# Patient Record
Sex: MALE | Race: WHITE | URBAN - METROPOLITAN AREA
[De-identification: names, ages, dates, MRNs, and addresses within clinical notes are randomized per-mention and may not be internally consistent; named-entity substitution may affect disease eponyms.]

---

## 2017-02-20 RX ORDER — PAROXETINE HYDROCHLORIDE 40 MG/1
TABLET, FILM COATED ORAL
Qty: 30 TABLET | Refills: 2 | Status: SHIPPED | OUTPATIENT
Start: 2017-02-20 | End: 2017-04-03

## 2017-04-03 ENCOUNTER — OFFICE VISIT (OUTPATIENT)
Dept: FAMILY MEDICINE CLINIC | Age: 72
End: 2017-04-03

## 2017-04-03 VITALS
DIASTOLIC BLOOD PRESSURE: 68 MMHG | HEART RATE: 77 BPM | BODY MASS INDEX: 33.14 KG/M2 | TEMPERATURE: 98.9 F | RESPIRATION RATE: 16 BRPM | WEIGHT: 242 LBS | SYSTOLIC BLOOD PRESSURE: 154 MMHG | OXYGEN SATURATION: 97 %

## 2017-04-03 DIAGNOSIS — F41.8 SITUATIONAL ANXIETY: ICD-10-CM

## 2017-04-03 DIAGNOSIS — I10 ESSENTIAL HYPERTENSION: ICD-10-CM

## 2017-04-03 DIAGNOSIS — E78.5 HYPERLIPIDEMIA, UNSPECIFIED HYPERLIPIDEMIA TYPE: ICD-10-CM

## 2017-04-03 DIAGNOSIS — H69.83 ETD (EUSTACHIAN TUBE DYSFUNCTION), BILATERAL: Primary | ICD-10-CM

## 2017-04-03 PROCEDURE — 1036F TOBACCO NON-USER: CPT | Performed by: FAMILY MEDICINE

## 2017-04-03 PROCEDURE — 3017F COLORECTAL CA SCREEN DOC REV: CPT | Performed by: FAMILY MEDICINE

## 2017-04-03 PROCEDURE — 4040F PNEUMOC VAC/ADMIN/RCVD: CPT | Performed by: FAMILY MEDICINE

## 2017-04-03 PROCEDURE — 99214 OFFICE O/P EST MOD 30 MIN: CPT | Performed by: FAMILY MEDICINE

## 2017-04-03 PROCEDURE — G8427 DOCREV CUR MEDS BY ELIG CLIN: HCPCS | Performed by: FAMILY MEDICINE

## 2017-04-03 PROCEDURE — G8419 CALC BMI OUT NRM PARAM NOF/U: HCPCS | Performed by: FAMILY MEDICINE

## 2017-04-03 PROCEDURE — 1123F ACP DISCUSS/DSCN MKR DOCD: CPT | Performed by: FAMILY MEDICINE

## 2017-04-03 RX ORDER — FLUTICASONE PROPIONATE 50 MCG
2 SPRAY, SUSPENSION (ML) NASAL DAILY
Qty: 1 BOTTLE | Refills: 2 | Status: SHIPPED | OUTPATIENT
Start: 2017-04-03 | End: 2018-05-16

## 2017-04-03 RX ORDER — LISINOPRIL 40 MG/1
40 TABLET ORAL DAILY
Qty: 90 TABLET | Refills: 1 | Status: SHIPPED | OUTPATIENT
Start: 2017-04-03 | End: 2017-10-02 | Stop reason: SDUPTHER

## 2017-04-03 RX ORDER — ESCITALOPRAM OXALATE 20 MG/1
20 TABLET ORAL DAILY
Qty: 30 TABLET | Refills: 5 | Status: SHIPPED | OUTPATIENT
Start: 2017-04-03 | End: 2017-11-16 | Stop reason: SDUPTHER

## 2017-04-03 RX ORDER — SIMVASTATIN 20 MG
20 TABLET ORAL NIGHTLY
Qty: 90 TABLET | Refills: 1 | Status: SHIPPED | OUTPATIENT
Start: 2017-04-03 | End: 2017-10-11 | Stop reason: SDUPTHER

## 2017-04-12 ENCOUNTER — OFFICE VISIT (OUTPATIENT)
Dept: DERMATOLOGY | Age: 72
End: 2017-04-12

## 2017-04-12 DIAGNOSIS — Z85.828 HISTORY OF NONMELANOMA SKIN CANCER: ICD-10-CM

## 2017-04-12 DIAGNOSIS — D22.9 MULTIPLE BENIGN NEVI: Primary | ICD-10-CM

## 2017-04-12 DIAGNOSIS — Z86.018 HISTORY OF DYSPLASTIC NEVUS: ICD-10-CM

## 2017-04-12 DIAGNOSIS — Z12.83 SCREENING EXAM FOR SKIN CANCER: ICD-10-CM

## 2017-04-12 DIAGNOSIS — L57.0 ACTINIC KERATOSES: ICD-10-CM

## 2017-04-12 DIAGNOSIS — L30.9 DERMATITIS: ICD-10-CM

## 2017-04-12 PROCEDURE — G8427 DOCREV CUR MEDS BY ELIG CLIN: HCPCS | Performed by: DERMATOLOGY

## 2017-04-12 PROCEDURE — 99203 OFFICE O/P NEW LOW 30 MIN: CPT | Performed by: DERMATOLOGY

## 2017-04-12 PROCEDURE — 17000 DESTRUCT PREMALG LESION: CPT | Performed by: DERMATOLOGY

## 2017-04-12 PROCEDURE — 1036F TOBACCO NON-USER: CPT | Performed by: DERMATOLOGY

## 2017-04-12 PROCEDURE — 3017F COLORECTAL CA SCREEN DOC REV: CPT | Performed by: DERMATOLOGY

## 2017-04-12 PROCEDURE — 17003 DESTRUCT PREMALG LES 2-14: CPT | Performed by: DERMATOLOGY

## 2017-04-12 PROCEDURE — 1123F ACP DISCUSS/DSCN MKR DOCD: CPT | Performed by: DERMATOLOGY

## 2017-04-12 PROCEDURE — 4040F PNEUMOC VAC/ADMIN/RCVD: CPT | Performed by: DERMATOLOGY

## 2017-04-12 PROCEDURE — G8419 CALC BMI OUT NRM PARAM NOF/U: HCPCS | Performed by: DERMATOLOGY

## 2017-05-09 ENCOUNTER — OFFICE VISIT (OUTPATIENT)
Dept: FAMILY MEDICINE CLINIC | Age: 72
End: 2017-05-09

## 2017-05-09 VITALS
WEIGHT: 235.5 LBS | OXYGEN SATURATION: 97 % | SYSTOLIC BLOOD PRESSURE: 139 MMHG | DIASTOLIC BLOOD PRESSURE: 71 MMHG | HEART RATE: 65 BPM | BODY MASS INDEX: 32.25 KG/M2

## 2017-05-09 DIAGNOSIS — Z00.00 ROUTINE GENERAL MEDICAL EXAMINATION AT A HEALTH CARE FACILITY: Primary | ICD-10-CM

## 2017-05-09 DIAGNOSIS — Z11.59 NEED FOR HEPATITIS C SCREENING TEST: ICD-10-CM

## 2017-05-09 DIAGNOSIS — M51.36 DDD (DEGENERATIVE DISC DISEASE), LUMBAR: ICD-10-CM

## 2017-05-09 DIAGNOSIS — H91.93 HEARING DIFFICULTY OF BOTH EARS: ICD-10-CM

## 2017-05-09 DIAGNOSIS — K63.5 COLON POLYPS: ICD-10-CM

## 2017-05-09 DIAGNOSIS — I10 ESSENTIAL HYPERTENSION: ICD-10-CM

## 2017-05-09 DIAGNOSIS — F41.9 ANXIETY: ICD-10-CM

## 2017-05-09 DIAGNOSIS — Z12.5 PROSTATE CANCER SCREENING: ICD-10-CM

## 2017-05-09 DIAGNOSIS — J45.20 ASTHMA, MILD INTERMITTENT, WELL-CONTROLLED: ICD-10-CM

## 2017-05-09 DIAGNOSIS — E78.5 HYPERLIPIDEMIA, UNSPECIFIED HYPERLIPIDEMIA TYPE: ICD-10-CM

## 2017-05-09 DIAGNOSIS — H60.332 ACUTE SWIMMER'S EAR OF LEFT SIDE: ICD-10-CM

## 2017-05-09 PROCEDURE — 92551 PURE TONE HEARING TEST AIR: CPT | Performed by: FAMILY MEDICINE

## 2017-05-09 PROCEDURE — G0439 PPPS, SUBSEQ VISIT: HCPCS | Performed by: FAMILY MEDICINE

## 2017-05-12 LAB
ALBUMIN SERPL-MCNC: 4.3 G/DL (ref 3.5–5)
ALP BLD-CCNC: 64 IU/L (ref 35–135)
ALT SERPL-CCNC: 25 IU/L (ref 10–60)
AST SERPL-CCNC: 24 IU/L (ref 10–40)
BILIRUB SERPL-MCNC: 0.7 MG/DL (ref 0–1.2)
BUN BLDV-MCNC: 14 MG/DL (ref 8–26)
CALCIUM SERPL-MCNC: 9.1 MG/DL (ref 8.5–10.5)
CHLORIDE BLD-SCNC: 105 MEQ/L (ref 101–111)
CHOLESTEROL, TOTAL: 147 MG/DL
CHOLESTEROL/HDL RATIO: 4.7 (ref 1.9–4.2)
CO2: 27 MEQ/L (ref 24–36)
CREAT SERPL-MCNC: 0.97 MG/DL (ref 0.64–1.27)
GFR AFRICAN AMERICAN: >60 ML/MIN/1.73 SQ METER
GFR NON-AFRICAN AMERICAN: >60 ML/MIN/1.73 SQ METER
GLUCOSE BLD-MCNC: 100 MG/DL (ref 70–99)
HDLC SERPL-MCNC: 31 MG/DL
LDL CHOLESTEROL CALCULATED: 101 MG/DL
LDL/HDL RATIO: 3.3 (ref 1–4)
POTASSIUM SERPL-SCNC: 4.1 MEQ/L (ref 3.6–5.1)
PROSTATE SPECIFIC ANTIGEN: 1.26 NG/ML
SODIUM BLD-SCNC: 138 MEQ/L (ref 135–145)
TOTAL PROTEIN: 6.5 G/DL (ref 6–8)
TRIGL SERPL-MCNC: 76 MG/DL

## 2017-05-25 RX ORDER — PAROXETINE HYDROCHLORIDE 40 MG/1
TABLET, FILM COATED ORAL
Qty: 30 TABLET | Refills: 5 | OUTPATIENT
Start: 2017-05-25

## 2017-06-29 DIAGNOSIS — I10 ESSENTIAL HYPERTENSION: ICD-10-CM

## 2017-06-29 RX ORDER — AMLODIPINE BESYLATE 5 MG/1
TABLET ORAL
Qty: 90 TABLET | Refills: 1 | Status: SHIPPED | OUTPATIENT
Start: 2017-06-29 | End: 2017-12-30 | Stop reason: SDUPTHER

## 2017-10-02 DIAGNOSIS — I10 ESSENTIAL HYPERTENSION: ICD-10-CM

## 2017-10-02 RX ORDER — LISINOPRIL 40 MG/1
TABLET ORAL
Qty: 90 TABLET | Refills: 0 | Status: SHIPPED | OUTPATIENT
Start: 2017-10-02 | End: 2017-12-29 | Stop reason: SDUPTHER

## 2017-10-11 DIAGNOSIS — E78.5 HYPERLIPIDEMIA, UNSPECIFIED HYPERLIPIDEMIA TYPE: ICD-10-CM

## 2017-10-12 RX ORDER — SIMVASTATIN 20 MG
TABLET ORAL
Qty: 90 TABLET | Refills: 0 | Status: SHIPPED | OUTPATIENT
Start: 2017-10-12 | End: 2018-01-09 | Stop reason: SDUPTHER

## 2017-11-16 DIAGNOSIS — F41.8 SITUATIONAL ANXIETY: ICD-10-CM

## 2017-11-16 RX ORDER — ESCITALOPRAM OXALATE 20 MG/1
20 TABLET ORAL DAILY
Qty: 30 TABLET | Refills: 5 | Status: SHIPPED | OUTPATIENT
Start: 2017-11-16 | End: 2017-11-16 | Stop reason: SDUPTHER

## 2017-11-17 RX ORDER — ESCITALOPRAM OXALATE 20 MG/1
20 TABLET ORAL DAILY
Qty: 90 TABLET | Refills: 1 | Status: SHIPPED | OUTPATIENT
Start: 2017-11-17 | End: 2018-08-27 | Stop reason: SDUPTHER

## 2017-12-29 DIAGNOSIS — I10 ESSENTIAL HYPERTENSION: ICD-10-CM

## 2017-12-29 RX ORDER — LISINOPRIL 40 MG/1
TABLET ORAL
Qty: 90 TABLET | Refills: 0 | Status: SHIPPED | OUTPATIENT
Start: 2017-12-29 | End: 2018-03-26 | Stop reason: SDUPTHER

## 2017-12-30 DIAGNOSIS — I10 ESSENTIAL HYPERTENSION: ICD-10-CM

## 2018-01-02 RX ORDER — AMLODIPINE BESYLATE 5 MG/1
TABLET ORAL
Qty: 90 TABLET | Refills: 0 | Status: SHIPPED | OUTPATIENT
Start: 2018-01-02 | End: 2018-03-26 | Stop reason: SDUPTHER

## 2018-01-09 DIAGNOSIS — E78.5 HYPERLIPIDEMIA, UNSPECIFIED HYPERLIPIDEMIA TYPE: ICD-10-CM

## 2018-01-10 RX ORDER — SIMVASTATIN 20 MG
TABLET ORAL
Qty: 90 TABLET | Refills: 0 | Status: SHIPPED | OUTPATIENT
Start: 2018-01-10 | End: 2018-03-26 | Stop reason: SDUPTHER

## 2018-01-25 ENCOUNTER — OFFICE VISIT (OUTPATIENT)
Dept: FAMILY MEDICINE CLINIC | Age: 73
End: 2018-01-25

## 2018-01-25 VITALS
HEART RATE: 71 BPM | TEMPERATURE: 96.8 F | RESPIRATION RATE: 14 BRPM | HEIGHT: 73 IN | DIASTOLIC BLOOD PRESSURE: 75 MMHG | BODY MASS INDEX: 32.15 KG/M2 | SYSTOLIC BLOOD PRESSURE: 138 MMHG | WEIGHT: 242.6 LBS | OXYGEN SATURATION: 98 %

## 2018-01-25 DIAGNOSIS — F41.9 ANXIETY: ICD-10-CM

## 2018-01-25 DIAGNOSIS — E78.5 HYPERLIPIDEMIA, UNSPECIFIED HYPERLIPIDEMIA TYPE: ICD-10-CM

## 2018-01-25 DIAGNOSIS — I10 ESSENTIAL HYPERTENSION: ICD-10-CM

## 2018-01-25 DIAGNOSIS — Z11.59 NEED FOR HEPATITIS C SCREENING TEST: ICD-10-CM

## 2018-01-25 DIAGNOSIS — Z87.891 FORMER SMOKER: ICD-10-CM

## 2018-01-25 DIAGNOSIS — R63.5 WEIGHT GAIN: Primary | ICD-10-CM

## 2018-01-25 DIAGNOSIS — J45.20 ASTHMA, MILD INTERMITTENT, WELL-CONTROLLED: ICD-10-CM

## 2018-01-25 PROCEDURE — 99214 OFFICE O/P EST MOD 30 MIN: CPT | Performed by: FAMILY MEDICINE

## 2018-01-25 PROCEDURE — G8427 DOCREV CUR MEDS BY ELIG CLIN: HCPCS | Performed by: FAMILY MEDICINE

## 2018-01-25 PROCEDURE — G8510 SCR DEP NEG, NO PLAN REQD: HCPCS | Performed by: FAMILY MEDICINE

## 2018-01-25 PROCEDURE — 3017F COLORECTAL CA SCREEN DOC REV: CPT | Performed by: FAMILY MEDICINE

## 2018-01-25 PROCEDURE — G8417 CALC BMI ABV UP PARAM F/U: HCPCS | Performed by: FAMILY MEDICINE

## 2018-01-25 PROCEDURE — G8484 FLU IMMUNIZE NO ADMIN: HCPCS | Performed by: FAMILY MEDICINE

## 2018-01-25 PROCEDURE — 1123F ACP DISCUSS/DSCN MKR DOCD: CPT | Performed by: FAMILY MEDICINE

## 2018-01-25 PROCEDURE — 1036F TOBACCO NON-USER: CPT | Performed by: FAMILY MEDICINE

## 2018-01-25 PROCEDURE — 3288F FALL RISK ASSESSMENT DOCD: CPT | Performed by: FAMILY MEDICINE

## 2018-01-25 PROCEDURE — 4040F PNEUMOC VAC/ADMIN/RCVD: CPT | Performed by: FAMILY MEDICINE

## 2018-01-25 ASSESSMENT — PATIENT HEALTH QUESTIONNAIRE - PHQ9
2. FEELING DOWN, DEPRESSED OR HOPELESS: 0
SUM OF ALL RESPONSES TO PHQ9 QUESTIONS 1 & 2: 0
SUM OF ALL RESPONSES TO PHQ QUESTIONS 1-9: 0
1. LITTLE INTEREST OR PLEASURE IN DOING THINGS: 0

## 2018-01-25 NOTE — PROGRESS NOTES
-     Stable. Monitor with decrease of Lexapro to 10 mg qd( 1/2 of 20 mg ). Essential hypertension  -     Comprehensive Metabolic Panel; Future        -     Controlled and continue Norvasc 5 mg qd, Lisinopril 40 mg qd and low sodium diet. Hyperlipidemia, unspecified hyperlipidemia type  -     Comprehensive Metabolic Panel; Future, Lipid Panel; Future on Simvastatin 20 mg qd. Asthma, mild intermittent, well-controlled        -     Stable. Need for hepatitis C screening test  -     Hepatitis C Antibody; Future  Former smoker  -     US Abdominal Aorta Limited; Future and follow up after completed/ prn. Follow up 3 months/ prn.

## 2018-01-31 LAB
ALBUMIN SERPL-MCNC: 4.1 G/DL (ref 3.5–5)
ALP BLD-CCNC: 58 IU/L (ref 35–135)
ALT SERPL-CCNC: 29 IU/L (ref 10–60)
AST SERPL-CCNC: 25 IU/L (ref 10–40)
BILIRUB SERPL-MCNC: 1 MG/DL (ref 0–1.2)
BUN BLDV-MCNC: 15 MG/DL (ref 8–26)
CALCIUM SERPL-MCNC: 9.1 MG/DL (ref 8.5–10.5)
CHLORIDE BLD-SCNC: 105 MEQ/L (ref 101–111)
CHOLESTEROL, TOTAL: 111 MG/DL
CHOLESTEROL/HDL RATIO: 4.3 (ref 1.9–4.2)
CO2: 26 MEQ/L (ref 24–36)
CREAT SERPL-MCNC: 0.99 MG/DL (ref 0.64–1.27)
GFR AFRICAN AMERICAN: >60 ML/MIN/1.73 SQ METER
GFR NON-AFRICAN AMERICAN: >60 ML/MIN/1.73 SQ METER
GLUCOSE BLD-MCNC: 103 MG/DL (ref 70–99)
HCT VFR BLD CALC: 45 % (ref 40–50)
HDLC SERPL-MCNC: 26 MG/DL
HEMOGLOBIN: 15 G/DL (ref 13.5–16.5)
HEPATITIS C VIRUS RNA SER/PLAS NCNC: NONREACTIVE
LDL CHOLESTEROL CALCULATED: 73 MG/DL
LDL/HDL RATIO: 2.8 (ref 1–4)
MCH RBC QN AUTO: 31 PG (ref 27–33)
MCHC RBC AUTO-ENTMCNC: 33 G/DL (ref 32–36)
MCV RBC AUTO: 92 FL (ref 82–97)
PDW BLD-RTO: 13.1 %
PLATELET # BLD: 233 THOU/MCL (ref 140–375)
POTASSIUM SERPL-SCNC: 4.4 MEQ/L (ref 3.6–5.1)
RBC # BLD: 4.89 MIL/MCL (ref 4.4–5.8)
SODIUM BLD-SCNC: 141 MEQ/L (ref 135–145)
TOTAL PROTEIN: 6.7 G/DL (ref 6–8)
TRIGL SERPL-MCNC: 62 MG/DL
TSH ULTRASENSITIVE: 1.1 MIU/L (ref 0.45–5.33)
WBC # BLD: 6.5 THOU/MCL (ref 3.6–10.5)

## 2018-03-13 DIAGNOSIS — Z12.11 COLON CANCER SCREENING: Primary | ICD-10-CM

## 2018-03-13 LAB
CONTROL: NORMAL
HEMOCCULT STL QL: NEGATIVE

## 2018-03-13 PROCEDURE — 82274 ASSAY TEST FOR BLOOD FECAL: CPT | Performed by: FAMILY MEDICINE

## 2018-03-26 DIAGNOSIS — E78.5 HYPERLIPIDEMIA, UNSPECIFIED HYPERLIPIDEMIA TYPE: ICD-10-CM

## 2018-03-26 DIAGNOSIS — I10 ESSENTIAL HYPERTENSION: ICD-10-CM

## 2018-03-26 RX ORDER — SIMVASTATIN 20 MG
TABLET ORAL
Qty: 90 TABLET | Refills: 1 | Status: SHIPPED | OUTPATIENT
Start: 2018-03-26 | End: 2018-09-03 | Stop reason: SDUPTHER

## 2018-03-26 RX ORDER — LISINOPRIL 40 MG/1
TABLET ORAL
Qty: 90 TABLET | Refills: 1 | Status: SHIPPED | OUTPATIENT
Start: 2018-03-26 | End: 2018-09-16 | Stop reason: SDUPTHER

## 2018-03-26 RX ORDER — AMLODIPINE BESYLATE 5 MG/1
TABLET ORAL
Qty: 90 TABLET | Refills: 1 | Status: SHIPPED | OUTPATIENT
Start: 2018-03-26 | End: 2018-08-27 | Stop reason: DRUGHIGH

## 2018-04-02 DIAGNOSIS — I10 ESSENTIAL HYPERTENSION: ICD-10-CM

## 2018-04-02 RX ORDER — AMLODIPINE BESYLATE 5 MG/1
TABLET ORAL
Qty: 90 TABLET | Refills: 0 | Status: SHIPPED | OUTPATIENT
Start: 2018-04-02 | End: 2018-04-17 | Stop reason: SDUPTHER

## 2018-04-17 ENCOUNTER — OFFICE VISIT (OUTPATIENT)
Dept: DERMATOLOGY | Age: 73
End: 2018-04-17

## 2018-04-17 DIAGNOSIS — Z12.83 SCREENING EXAM FOR SKIN CANCER: ICD-10-CM

## 2018-04-17 DIAGNOSIS — D48.5 NEOPLASM OF UNCERTAIN BEHAVIOR OF SKIN: ICD-10-CM

## 2018-04-17 DIAGNOSIS — L30.9 DERMATITIS: ICD-10-CM

## 2018-04-17 DIAGNOSIS — D22.9 MULTIPLE BENIGN NEVI: Primary | ICD-10-CM

## 2018-04-17 DIAGNOSIS — Z85.828 HISTORY OF NONMELANOMA SKIN CANCER: ICD-10-CM

## 2018-04-17 DIAGNOSIS — L57.0 ACTINIC KERATOSES: ICD-10-CM

## 2018-04-17 DIAGNOSIS — Z86.018 HISTORY OF DYSPLASTIC NEVUS: ICD-10-CM

## 2018-04-17 PROCEDURE — 99214 OFFICE O/P EST MOD 30 MIN: CPT | Performed by: DERMATOLOGY

## 2018-04-17 PROCEDURE — G8427 DOCREV CUR MEDS BY ELIG CLIN: HCPCS | Performed by: DERMATOLOGY

## 2018-04-17 PROCEDURE — 17003 DESTRUCT PREMALG LES 2-14: CPT | Performed by: DERMATOLOGY

## 2018-04-17 PROCEDURE — 3017F COLORECTAL CA SCREEN DOC REV: CPT | Performed by: DERMATOLOGY

## 2018-04-17 PROCEDURE — 1036F TOBACCO NON-USER: CPT | Performed by: DERMATOLOGY

## 2018-04-17 PROCEDURE — G8417 CALC BMI ABV UP PARAM F/U: HCPCS | Performed by: DERMATOLOGY

## 2018-04-17 PROCEDURE — 11100 PR BIOPSY OF SKIN LESION: CPT | Performed by: DERMATOLOGY

## 2018-04-17 PROCEDURE — 17000 DESTRUCT PREMALG LESION: CPT | Performed by: DERMATOLOGY

## 2018-04-17 PROCEDURE — 4040F PNEUMOC VAC/ADMIN/RCVD: CPT | Performed by: DERMATOLOGY

## 2018-04-17 PROCEDURE — 1123F ACP DISCUSS/DSCN MKR DOCD: CPT | Performed by: DERMATOLOGY

## 2018-04-17 RX ORDER — TRIAMCINOLONE ACETONIDE 1 MG/G
CREAM TOPICAL
Qty: 45 G | Refills: 1 | Status: SHIPPED | OUTPATIENT
Start: 2018-04-17 | End: 2019-11-18 | Stop reason: SDUPTHER

## 2018-04-20 ENCOUNTER — TELEPHONE (OUTPATIENT)
Dept: DERMATOLOGY | Age: 73
End: 2018-04-20

## 2018-04-20 DIAGNOSIS — C44.319 BASAL CELL CARCINOMA OF LEFT CHEEK: Primary | ICD-10-CM

## 2018-05-16 ENCOUNTER — PROCEDURE VISIT (OUTPATIENT)
Dept: SURGERY | Age: 73
End: 2018-05-16

## 2018-05-16 VITALS
HEART RATE: 75 BPM | WEIGHT: 241 LBS | OXYGEN SATURATION: 75 % | TEMPERATURE: 97.4 F | SYSTOLIC BLOOD PRESSURE: 166 MMHG | DIASTOLIC BLOOD PRESSURE: 80 MMHG | BODY MASS INDEX: 31.8 KG/M2

## 2018-05-16 DIAGNOSIS — C44.319 BASAL CELL CARCINOMA OF LEFT CHEEK: Primary | ICD-10-CM

## 2018-05-16 PROCEDURE — 17311 MOHS 1 STAGE H/N/HF/G: CPT | Performed by: DERMATOLOGY

## 2018-05-16 PROCEDURE — 13132 CMPLX RPR F/C/C/M/N/AX/G/H/F: CPT | Performed by: DERMATOLOGY

## 2018-05-17 ENCOUNTER — TELEPHONE (OUTPATIENT)
Dept: SURGERY | Age: 73
End: 2018-05-17

## 2018-05-29 ENCOUNTER — TELEPHONE (OUTPATIENT)
Dept: FAMILY MEDICINE CLINIC | Age: 73
End: 2018-05-29

## 2018-05-29 RX ORDER — DICLOFENAC SODIUM 75 MG/1
TABLET, DELAYED RELEASE ORAL
Qty: 180 TABLET | Refills: 0 | Status: SHIPPED | OUTPATIENT
Start: 2018-05-29 | End: 2018-08-29 | Stop reason: SDUPTHER

## 2018-06-18 ENCOUNTER — TELEPHONE (OUTPATIENT)
Dept: FAMILY MEDICINE CLINIC | Age: 73
End: 2018-06-18

## 2018-06-18 DIAGNOSIS — J45.909 UNCOMPLICATED ASTHMA, UNSPECIFIED ASTHMA SEVERITY, UNSPECIFIED WHETHER PERSISTENT: ICD-10-CM

## 2018-06-18 RX ORDER — ALBUTEROL SULFATE 90 UG/1
2 AEROSOL, METERED RESPIRATORY (INHALATION) EVERY 6 HOURS PRN
Qty: 1 INHALER | Refills: 2 | Status: SHIPPED | OUTPATIENT
Start: 2018-06-18 | End: 2019-07-17 | Stop reason: SDUPTHER

## 2018-06-22 DIAGNOSIS — R06.02 SOB (SHORTNESS OF BREATH): ICD-10-CM

## 2018-06-22 DIAGNOSIS — J45.41 MODERATE PERSISTENT ASTHMA WITH EXACERBATION: ICD-10-CM

## 2018-06-22 RX ORDER — FLUTICASONE PROPIONATE 110 UG/1
2 AEROSOL, METERED RESPIRATORY (INHALATION) 2 TIMES DAILY
Qty: 1 INHALER | Refills: 2 | Status: SHIPPED | OUTPATIENT
Start: 2018-06-22 | End: 2019-06-22

## 2018-06-22 RX ORDER — FLUTICASONE PROPIONATE 110 UG/1
2 AEROSOL, METERED RESPIRATORY (INHALATION) 2 TIMES DAILY
Qty: 1 INHALER | Refills: 2 | OUTPATIENT
Start: 2018-06-22 | End: 2019-06-22

## 2018-07-16 ENCOUNTER — OFFICE VISIT (OUTPATIENT)
Dept: FAMILY MEDICINE CLINIC | Age: 73
End: 2018-07-16

## 2018-07-16 VITALS
SYSTOLIC BLOOD PRESSURE: 138 MMHG | BODY MASS INDEX: 32.53 KG/M2 | OXYGEN SATURATION: 98 % | HEART RATE: 65 BPM | WEIGHT: 246.6 LBS | RESPIRATION RATE: 16 BRPM | DIASTOLIC BLOOD PRESSURE: 78 MMHG | TEMPERATURE: 96.4 F

## 2018-07-16 DIAGNOSIS — E78.5 HYPERLIPIDEMIA, UNSPECIFIED HYPERLIPIDEMIA TYPE: ICD-10-CM

## 2018-07-16 DIAGNOSIS — J45.20 ASTHMA, MILD INTERMITTENT, WELL-CONTROLLED: ICD-10-CM

## 2018-07-16 DIAGNOSIS — R06.02 SOB (SHORTNESS OF BREATH): Primary | ICD-10-CM

## 2018-07-16 DIAGNOSIS — I10 ESSENTIAL HYPERTENSION: ICD-10-CM

## 2018-07-16 DIAGNOSIS — R94.31 ABNORMAL EKG: ICD-10-CM

## 2018-07-16 DIAGNOSIS — F41.9 ANXIETY: ICD-10-CM

## 2018-07-16 PROCEDURE — 1036F TOBACCO NON-USER: CPT | Performed by: FAMILY MEDICINE

## 2018-07-16 PROCEDURE — G8427 DOCREV CUR MEDS BY ELIG CLIN: HCPCS | Performed by: FAMILY MEDICINE

## 2018-07-16 PROCEDURE — 4040F PNEUMOC VAC/ADMIN/RCVD: CPT | Performed by: FAMILY MEDICINE

## 2018-07-16 PROCEDURE — 99214 OFFICE O/P EST MOD 30 MIN: CPT | Performed by: FAMILY MEDICINE

## 2018-07-16 PROCEDURE — 3017F COLORECTAL CA SCREEN DOC REV: CPT | Performed by: FAMILY MEDICINE

## 2018-07-16 PROCEDURE — 1123F ACP DISCUSS/DSCN MKR DOCD: CPT | Performed by: FAMILY MEDICINE

## 2018-07-16 PROCEDURE — 93000 ELECTROCARDIOGRAM COMPLETE: CPT | Performed by: FAMILY MEDICINE

## 2018-07-16 PROCEDURE — G8417 CALC BMI ABV UP PARAM F/U: HCPCS | Performed by: FAMILY MEDICINE

## 2018-07-16 PROCEDURE — 1101F PT FALLS ASSESS-DOCD LE1/YR: CPT | Performed by: FAMILY MEDICINE

## 2018-07-16 NOTE — PROGRESS NOTES
FROM  CV: Regular rate and rhythm , no murmurs/ rub/ gallop. No edema. Lungs : CTA bilaterally, breathing comfortably  Abdomen: positive bowel sounds, soft , non tender, non distended. No hepatosplenomegaly. No CVA tenderness. Skin: no rashes. Non tender. ASSESSMENT/  PLAN:  1. SOB (shortness of breath)  - EKG 12 Lead- sinus, but T wave changes.    - ECHO Stress Test and follow up after completed/ prn.   - CBC; Future,  TSH without Reflex; Future  - Comprehensive Metabolic Panel; Future  - CK; Future,  TROPONIN; Future    2. Abnormal EKG  - ECHO Stress Test and follow up after completed/ prn.   - CK; Future,  TROPONIN; Future    3. Asthma, mild intermittent, well-controlled  - continue Flovent bid and Singulair 10 mg qd . Albuterol HFA prn.     4. Anxiety  - continue Lexapro 20 mg qd. Discussed adding Wellbutrin XL ,but will hold and re-evaluate in 2 weeks. Consider tapering Lexapro in the future. 5. Essential hypertension  - controlled and continue Norvasc 5 mg qd, Lisinopril 40 mg qd and low sodium diet. - EKG 12 Lead  - Comprehensive Metabolic Panel; Future    6. Hyperlipidemia, unspecified hyperlipidemia type  - controlled and continue Simvastatin 20 mg qd and low cholesterol diet. - Comprehensive Metabolic Panel; Future,  Lipid Panel; Future     follow up after Stress echo/ prn increased symptoms.

## 2018-07-17 LAB
ALBUMIN SERPL-MCNC: 4.1 G/DL (ref 3.5–5)
ALP BLD-CCNC: 62 IU/L (ref 35–135)
ALT SERPL-CCNC: 32 IU/L (ref 10–60)
ANION GAP SERPL CALCULATED.3IONS-SCNC: 9 MMOL/L (ref 6–18)
AST SERPL-CCNC: 23 IU/L (ref 10–40)
BILIRUB SERPL-MCNC: 1 MG/DL (ref 0–1.2)
BUN BLDV-MCNC: 12 MG/DL (ref 8–26)
CALCIUM SERPL-MCNC: 9.2 MG/DL (ref 8.5–10.5)
CHLORIDE BLD-SCNC: 101 MEQ/L (ref 101–111)
CHOLESTEROL, TOTAL: 131 MG/DL
CO2: 26 MMOL/L (ref 24–36)
CREAT SERPL-MCNC: 0.98 MG/DL (ref 0.64–1.27)
CREATINE KINASE ISOENZYMES, SER/PLAS: 88 IU/L (ref 0–200)
GFR AFRICAN AMERICAN: 87 ML/MIN/1.73 M2
GFR NON-AFRICAN AMERICAN: 75 ML/MIN/1.73 M2
GLUCOSE BLD-MCNC: 95 MG/DL (ref 70–99)
HCT VFR BLD CALC: 45.5 % (ref 40–50)
HDLC SERPL-MCNC: 28 MG/DL
HEMOGLOBIN: 15.1 G/DL (ref 13.5–16.5)
LDL CHOLESTEROL CALCULATED: 83 MG/DL
MCH RBC QN AUTO: 30.8 PG (ref 27–33)
MCHC RBC AUTO-ENTMCNC: 33.2 G/DL (ref 32–36)
MCV RBC AUTO: 92.9 FL (ref 82–97)
NON HDL CHOL. (LDL+VLDL): 103 MG/DL
PDW BLD-RTO: 13.5 %
PLATELET # BLD: 234 THOU/MCL (ref 140–375)
PMV BLD AUTO: 7.7 FL (ref 7.4–11.5)
POTASSIUM SERPL-SCNC: 4.1 MEQ/L (ref 3.6–5.1)
RBC # BLD: 4.9 MIL/MCL (ref 4.4–5.8)
SODIUM BLD-SCNC: 136 MEQ/L (ref 135–145)
TOTAL PROTEIN: 6.4 G/DL (ref 6–8)
TRIGL SERPL-MCNC: 98 MG/DL
TROPONIN I: <0.03 NG/ML
TSH ULTRASENSITIVE: 1.94 MIU/L (ref 0.27–4.2)
WBC # BLD: 7.3 THOU/MCL (ref 3.6–10.5)

## 2018-07-24 LAB
LEFT VENTRICULAR EJECTION FRACTION HIGH VALUE: NORMAL %
LEFT VENTRICULAR EJECTION FRACTION MODE: NORMAL
LV EF: NORMAL %

## 2018-07-31 ENCOUNTER — TELEPHONE (OUTPATIENT)
Dept: FAMILY MEDICINE CLINIC | Age: 73
End: 2018-07-31

## 2018-08-01 RX ORDER — BUPROPION HYDROCHLORIDE 150 MG/1
150 TABLET ORAL EVERY MORNING
Qty: 30 TABLET | Refills: 1 | Status: SHIPPED | OUTPATIENT
Start: 2018-08-01 | End: 2018-08-27 | Stop reason: SDUPTHER

## 2018-08-27 ENCOUNTER — OFFICE VISIT (OUTPATIENT)
Dept: FAMILY MEDICINE CLINIC | Age: 73
End: 2018-08-27

## 2018-08-27 VITALS
BODY MASS INDEX: 30.67 KG/M2 | HEIGHT: 74 IN | RESPIRATION RATE: 12 BRPM | WEIGHT: 239 LBS | TEMPERATURE: 97.3 F | SYSTOLIC BLOOD PRESSURE: 141 MMHG | DIASTOLIC BLOOD PRESSURE: 73 MMHG | HEART RATE: 72 BPM | OXYGEN SATURATION: 98 %

## 2018-08-27 DIAGNOSIS — E78.5 HYPERLIPIDEMIA, UNSPECIFIED HYPERLIPIDEMIA TYPE: ICD-10-CM

## 2018-08-27 DIAGNOSIS — I10 ESSENTIAL HYPERTENSION: ICD-10-CM

## 2018-08-27 DIAGNOSIS — F41.8 SITUATIONAL ANXIETY: ICD-10-CM

## 2018-08-27 DIAGNOSIS — J45.20 ASTHMA, MILD INTERMITTENT, WELL-CONTROLLED: ICD-10-CM

## 2018-08-27 DIAGNOSIS — F41.9 ANXIETY: Primary | ICD-10-CM

## 2018-08-27 PROCEDURE — 1036F TOBACCO NON-USER: CPT | Performed by: FAMILY MEDICINE

## 2018-08-27 PROCEDURE — G8417 CALC BMI ABV UP PARAM F/U: HCPCS | Performed by: FAMILY MEDICINE

## 2018-08-27 PROCEDURE — 1123F ACP DISCUSS/DSCN MKR DOCD: CPT | Performed by: FAMILY MEDICINE

## 2018-08-27 PROCEDURE — 4040F PNEUMOC VAC/ADMIN/RCVD: CPT | Performed by: FAMILY MEDICINE

## 2018-08-27 PROCEDURE — 1101F PT FALLS ASSESS-DOCD LE1/YR: CPT | Performed by: FAMILY MEDICINE

## 2018-08-27 PROCEDURE — 3017F COLORECTAL CA SCREEN DOC REV: CPT | Performed by: FAMILY MEDICINE

## 2018-08-27 PROCEDURE — G8427 DOCREV CUR MEDS BY ELIG CLIN: HCPCS | Performed by: FAMILY MEDICINE

## 2018-08-27 PROCEDURE — 99214 OFFICE O/P EST MOD 30 MIN: CPT | Performed by: FAMILY MEDICINE

## 2018-08-27 RX ORDER — ESCITALOPRAM OXALATE 20 MG/1
TABLET ORAL
Qty: 30 TABLET | Refills: 0 | Status: SHIPPED | OUTPATIENT
Start: 2018-08-27 | End: 2018-09-04 | Stop reason: SDUPTHER

## 2018-08-27 RX ORDER — BUPROPION HYDROCHLORIDE 150 MG/1
150 TABLET ORAL EVERY MORNING
Qty: 30 TABLET | Refills: 1 | Status: CANCELLED | OUTPATIENT
Start: 2018-08-27

## 2018-08-27 RX ORDER — AMLODIPINE BESYLATE 10 MG/1
10 TABLET ORAL DAILY
Qty: 30 TABLET | Refills: 1 | Status: SHIPPED | OUTPATIENT
Start: 2018-08-27 | End: 2018-10-08 | Stop reason: SDUPTHER

## 2018-08-27 RX ORDER — BUPROPION HYDROCHLORIDE 150 MG/1
150 TABLET ORAL EVERY MORNING
Qty: 30 TABLET | Refills: 1 | Status: SHIPPED | OUTPATIENT
Start: 2018-08-27 | End: 2018-09-04 | Stop reason: SDUPTHER

## 2018-08-27 NOTE — PROGRESS NOTES
hypertension  - elevated. Increase Norvasc to 10 mg qd . Continue Lisinopril 40 mg qd and low sodium diet. - amLODIPine (NORVASC) 10 MG tablet; Take 1 tablet by mouth daily  Dispense: 30 tablet; Refill: 1    3. Hyperlipidemia, unspecified hyperlipidemia type  - stable. Continue Simvastatin 20 mg qd and low cholesterol diet. 4. Asthma, mild intermittent, well-controlled  - stable. Follow up 4 -6 weeks/ prn.

## 2018-08-29 RX ORDER — DICLOFENAC SODIUM 75 MG/1
TABLET, DELAYED RELEASE ORAL
Qty: 180 TABLET | Refills: 0 | Status: SHIPPED | OUTPATIENT
Start: 2018-08-29 | End: 2019-03-31 | Stop reason: SDUPTHER

## 2018-09-03 DIAGNOSIS — F41.8 SITUATIONAL ANXIETY: ICD-10-CM

## 2018-09-03 DIAGNOSIS — F41.9 ANXIETY: ICD-10-CM

## 2018-09-03 DIAGNOSIS — E78.5 HYPERLIPIDEMIA, UNSPECIFIED HYPERLIPIDEMIA TYPE: ICD-10-CM

## 2018-09-04 RX ORDER — SIMVASTATIN 20 MG
TABLET ORAL
Qty: 90 TABLET | Refills: 1 | Status: SHIPPED | OUTPATIENT
Start: 2018-09-04 | End: 2018-12-05 | Stop reason: SDUPTHER

## 2018-09-04 RX ORDER — ESCITALOPRAM OXALATE 20 MG/1
TABLET ORAL
Qty: 90 TABLET | Refills: 1 | Status: SHIPPED | OUTPATIENT
Start: 2018-09-04 | End: 2019-04-16 | Stop reason: ALTCHOICE

## 2018-09-04 RX ORDER — BUPROPION HYDROCHLORIDE 150 MG/1
150 TABLET ORAL EVERY MORNING
Qty: 90 TABLET | Refills: 1 | Status: SHIPPED | OUTPATIENT
Start: 2018-09-04 | End: 2020-10-14 | Stop reason: DRUGHIGH

## 2018-09-16 DIAGNOSIS — I10 ESSENTIAL HYPERTENSION: ICD-10-CM

## 2018-09-17 RX ORDER — LISINOPRIL 40 MG/1
40 TABLET ORAL DAILY
Qty: 90 TABLET | Refills: 1 | Status: SHIPPED | OUTPATIENT
Start: 2018-09-17 | End: 2019-03-13 | Stop reason: SDUPTHER

## 2018-10-08 ENCOUNTER — OFFICE VISIT (OUTPATIENT)
Dept: FAMILY MEDICINE CLINIC | Age: 73
End: 2018-10-08
Payer: MEDICARE

## 2018-10-08 VITALS
HEART RATE: 69 BPM | OXYGEN SATURATION: 97 % | BODY MASS INDEX: 30.92 KG/M2 | WEIGHT: 237.6 LBS | SYSTOLIC BLOOD PRESSURE: 122 MMHG | TEMPERATURE: 95.3 F | DIASTOLIC BLOOD PRESSURE: 57 MMHG

## 2018-10-08 DIAGNOSIS — J45.20 ASTHMA, MILD INTERMITTENT, WELL-CONTROLLED: ICD-10-CM

## 2018-10-08 DIAGNOSIS — E78.5 HYPERLIPIDEMIA, UNSPECIFIED HYPERLIPIDEMIA TYPE: Primary | ICD-10-CM

## 2018-10-08 DIAGNOSIS — I10 ESSENTIAL HYPERTENSION: ICD-10-CM

## 2018-10-08 DIAGNOSIS — F41.9 ANXIETY: ICD-10-CM

## 2018-10-08 DIAGNOSIS — Z87.891 FORMER SMOKER: ICD-10-CM

## 2018-10-08 PROCEDURE — 1101F PT FALLS ASSESS-DOCD LE1/YR: CPT | Performed by: FAMILY MEDICINE

## 2018-10-08 PROCEDURE — G8417 CALC BMI ABV UP PARAM F/U: HCPCS | Performed by: FAMILY MEDICINE

## 2018-10-08 PROCEDURE — 1123F ACP DISCUSS/DSCN MKR DOCD: CPT | Performed by: FAMILY MEDICINE

## 2018-10-08 PROCEDURE — G8484 FLU IMMUNIZE NO ADMIN: HCPCS | Performed by: FAMILY MEDICINE

## 2018-10-08 PROCEDURE — 3017F COLORECTAL CA SCREEN DOC REV: CPT | Performed by: FAMILY MEDICINE

## 2018-10-08 PROCEDURE — 99214 OFFICE O/P EST MOD 30 MIN: CPT | Performed by: FAMILY MEDICINE

## 2018-10-08 PROCEDURE — 4040F PNEUMOC VAC/ADMIN/RCVD: CPT | Performed by: FAMILY MEDICINE

## 2018-10-08 PROCEDURE — 1036F TOBACCO NON-USER: CPT | Performed by: FAMILY MEDICINE

## 2018-10-08 PROCEDURE — G8427 DOCREV CUR MEDS BY ELIG CLIN: HCPCS | Performed by: FAMILY MEDICINE

## 2018-10-08 RX ORDER — AMLODIPINE BESYLATE 10 MG/1
10 TABLET ORAL DAILY
Qty: 90 TABLET | Refills: 1 | Status: SHIPPED | OUTPATIENT
Start: 2018-10-08 | End: 2019-04-10 | Stop reason: SDUPTHER

## 2018-10-08 RX ORDER — BUPROPION HYDROCHLORIDE 300 MG/1
300 TABLET ORAL EVERY MORNING
Qty: 90 TABLET | Refills: 0 | Status: SHIPPED | OUTPATIENT
Start: 2018-10-08 | End: 2018-12-27 | Stop reason: SDUPTHER

## 2018-10-08 NOTE — PROGRESS NOTES
300 mg qd and stop Lexapro 10 mg qd ( 1/2 of 20 mg pill) in 3-4 weeks. 5. Former smoker  - [address]; Future and follow up after completed/ prn. Follow up in 2-3 months/ prn.

## 2018-10-16 ENCOUNTER — OFFICE VISIT (OUTPATIENT)
Dept: DERMATOLOGY | Age: 73
End: 2018-10-16
Payer: MEDICARE

## 2018-10-16 DIAGNOSIS — D22.9 MULTIPLE BENIGN NEVI: Primary | ICD-10-CM

## 2018-10-16 DIAGNOSIS — Z12.83 SCREENING EXAM FOR SKIN CANCER: ICD-10-CM

## 2018-10-16 DIAGNOSIS — L57.0 ACTINIC KERATOSES: ICD-10-CM

## 2018-10-16 DIAGNOSIS — Z86.018 HISTORY OF DYSPLASTIC NEVUS: ICD-10-CM

## 2018-10-16 DIAGNOSIS — Z85.828 HISTORY OF NONMELANOMA SKIN CANCER: ICD-10-CM

## 2018-10-16 PROCEDURE — 1036F TOBACCO NON-USER: CPT | Performed by: DERMATOLOGY

## 2018-10-16 PROCEDURE — 99213 OFFICE O/P EST LOW 20 MIN: CPT | Performed by: DERMATOLOGY

## 2018-10-16 PROCEDURE — 4040F PNEUMOC VAC/ADMIN/RCVD: CPT | Performed by: DERMATOLOGY

## 2018-10-16 PROCEDURE — 17000 DESTRUCT PREMALG LESION: CPT | Performed by: DERMATOLOGY

## 2018-10-16 PROCEDURE — 3017F COLORECTAL CA SCREEN DOC REV: CPT | Performed by: DERMATOLOGY

## 2018-10-16 PROCEDURE — 17003 DESTRUCT PREMALG LES 2-14: CPT | Performed by: DERMATOLOGY

## 2018-10-16 PROCEDURE — G8427 DOCREV CUR MEDS BY ELIG CLIN: HCPCS | Performed by: DERMATOLOGY

## 2018-10-16 PROCEDURE — G8484 FLU IMMUNIZE NO ADMIN: HCPCS | Performed by: DERMATOLOGY

## 2018-10-16 PROCEDURE — G8417 CALC BMI ABV UP PARAM F/U: HCPCS | Performed by: DERMATOLOGY

## 2018-10-16 PROCEDURE — 1123F ACP DISCUSS/DSCN MKR DOCD: CPT | Performed by: DERMATOLOGY

## 2018-10-16 PROCEDURE — 1101F PT FALLS ASSESS-DOCD LE1/YR: CPT | Performed by: DERMATOLOGY

## 2018-10-16 NOTE — PATIENT INSTRUCTIONS
rubbing alcohol or hydrogen peroxide.  Continue this regimen until the area is pink and healed. Depending on the size and location of your cryosurgery site, healing may take 2 to 4 weeks.  The area may continue to be pink for several weeks, and over the next few months may become darker or lighter than the surrounding skin. This may be a permanent change. DRY SKIN CARE    1. Do not take more than 1 shower or bath each day. Try to spend 10 minutes or less in the shower/bath. 2. Use a moisturizing soap such as Dove, Oil of Olay or Cetaphil. Antibacterial soaps can be too drying. 3. Use soap only on limited areas (face, underarms, groin). Try to avoid using soap on the arms, legs, trunk and back. 4. After showering, pat dry with a towel and generously apply a thick moisturizer all over. 5. If you are able, apply the moisturizer a second time during the day as well. 6. If a prescription cream or ointment has been ordered for you, apply the prescription medication to affected areas after your bath/shower while the skin is still damp, then apply the moisturizer as above. 7. When it comes to moisturizers, the thicker the better. Ointments (such as vaseline) are thicker than creams, and creams are thicker than lotions.  Suggested over-the-counter moisturizers include:    · CeraVe Cream  · Cetaphil Cream  · Lubriderm Cream  · Vaseline (petroleum jelly)  · Aquaphor  · Eucerin Cream  · Neutrogena Moisturizing Cream  · Neutrogena Hand Cream  · Aveeno Moisturizing Cream or Lotion  · Curel Cream

## 2018-12-05 DIAGNOSIS — E78.5 HYPERLIPIDEMIA, UNSPECIFIED HYPERLIPIDEMIA TYPE: ICD-10-CM

## 2018-12-05 RX ORDER — SIMVASTATIN 20 MG
TABLET ORAL
Qty: 90 TABLET | Refills: 0 | Status: SHIPPED | OUTPATIENT
Start: 2018-12-05 | End: 2019-05-01 | Stop reason: SDUPTHER

## 2018-12-27 RX ORDER — BUPROPION HYDROCHLORIDE 300 MG/1
TABLET ORAL
Qty: 90 TABLET | Refills: 0 | Status: SHIPPED | OUTPATIENT
Start: 2018-12-27 | End: 2019-05-01 | Stop reason: SDUPTHER

## 2019-03-13 DIAGNOSIS — I10 ESSENTIAL HYPERTENSION: ICD-10-CM

## 2019-03-13 RX ORDER — LISINOPRIL 40 MG/1
40 TABLET ORAL DAILY
Qty: 90 TABLET | Refills: 0 | Status: SHIPPED | OUTPATIENT
Start: 2019-03-13 | End: 2019-05-01 | Stop reason: SDUPTHER

## 2019-04-01 RX ORDER — DICLOFENAC SODIUM 75 MG/1
TABLET, DELAYED RELEASE ORAL
Qty: 30 TABLET | Refills: 0 | Status: SHIPPED | OUTPATIENT
Start: 2019-04-01 | End: 2019-05-01 | Stop reason: SDUPTHER

## 2019-04-03 DIAGNOSIS — E78.5 HYPERLIPIDEMIA, UNSPECIFIED HYPERLIPIDEMIA TYPE: ICD-10-CM

## 2019-04-03 DIAGNOSIS — I10 ESSENTIAL HYPERTENSION: Primary | ICD-10-CM

## 2019-04-05 LAB
ALBUMIN SERPL-MCNC: 4.2 G/DL (ref 3.5–5)
ALP BLD-CCNC: 64 IU/L (ref 35–135)
ALT SERPL-CCNC: 35 IU/L (ref 10–60)
ANION GAP SERPL CALCULATED.3IONS-SCNC: 8 MMOL/L (ref 6–18)
AST SERPL-CCNC: 26 IU/L (ref 10–40)
BILIRUB SERPL-MCNC: 0.7 MG/DL (ref 0–1.2)
BUN BLDV-MCNC: 15 MG/DL (ref 8–26)
CALCIUM SERPL-MCNC: 9.2 MG/DL (ref 8.5–10.5)
CHLORIDE BLD-SCNC: 107 MEQ/L (ref 101–111)
CHOLESTEROL, TOTAL: 137 MG/DL
CHOLESTEROL: 107 MG/DL
CO2: 24 MMOL/L (ref 24–36)
CREAT SERPL-MCNC: 1.09 MG/DL (ref 0.64–1.27)
GFR AFRICAN AMERICAN: 76 ML/MIN/1.73 M2
GFR NON-AFRICAN AMERICAN: 66 ML/MIN/1.73 M2
GLUCOSE BLD-MCNC: 97 MG/DL (ref 70–99)
HDLC SERPL-MCNC: 30 MG/DL
LDL CHOLESTEROL CALCULATED: 96 MG/DL
POTASSIUM SERPL-SCNC: 4.3 MEQ/L (ref 3.6–5.1)
SODIUM BLD-SCNC: 139 MEQ/L (ref 135–145)
TOTAL PROTEIN: 7 G/DL (ref 6–8)
TRIGL SERPL-MCNC: 53 MG/DL

## 2019-04-10 DIAGNOSIS — I10 ESSENTIAL HYPERTENSION: ICD-10-CM

## 2019-04-10 RX ORDER — AMLODIPINE BESYLATE 10 MG/1
TABLET ORAL
Qty: 30 TABLET | Refills: 0 | Status: SHIPPED | OUTPATIENT
Start: 2019-04-10 | End: 2019-05-01 | Stop reason: SDUPTHER

## 2019-04-16 ENCOUNTER — OFFICE VISIT (OUTPATIENT)
Dept: DERMATOLOGY | Age: 74
End: 2019-04-16
Payer: MEDICARE

## 2019-04-16 DIAGNOSIS — L57.0 ACTINIC KERATOSES: ICD-10-CM

## 2019-04-16 DIAGNOSIS — D22.9 MULTIPLE BENIGN NEVI: Primary | ICD-10-CM

## 2019-04-16 DIAGNOSIS — Z85.828 HISTORY OF NONMELANOMA SKIN CANCER: ICD-10-CM

## 2019-04-16 DIAGNOSIS — Z12.83 SCREENING EXAM FOR SKIN CANCER: ICD-10-CM
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
## 2019-04-16 DIAGNOSIS — Z86.018 HISTORY OF DYSPLASTIC NEVUS: ICD-10-CM

## 2019-04-16 PROCEDURE — 4040F PNEUMOC VAC/ADMIN/RCVD: CPT | Performed by: DERMATOLOGY

## 2019-04-16 PROCEDURE — 99214 OFFICE O/P EST MOD 30 MIN: CPT | Performed by: DERMATOLOGY

## 2019-04-16 PROCEDURE — 17000 DESTRUCT PREMALG LESION: CPT | Performed by: DERMATOLOGY

## 2019-04-16 PROCEDURE — G8427 DOCREV CUR MEDS BY ELIG CLIN: HCPCS | Performed by: DERMATOLOGY

## 2019-04-16 PROCEDURE — 1123F ACP DISCUSS/DSCN MKR DOCD: CPT | Performed by: DERMATOLOGY

## 2019-04-16 PROCEDURE — 1036F TOBACCO NON-USER: CPT | Performed by: DERMATOLOGY

## 2019-04-16 PROCEDURE — G8417 CALC BMI ABV UP PARAM F/U: HCPCS | Performed by: DERMATOLOGY

## 2019-04-16 PROCEDURE — 3017F COLORECTAL CA SCREEN DOC REV: CPT | Performed by: DERMATOLOGY

## 2019-04-16 PROCEDURE — 17003 DESTRUCT PREMALG LES 2-14: CPT | Performed by: DERMATOLOGY

## 2019-04-16 NOTE — PROGRESS NOTES
[facility]) Dermatology  Venu Betts MD  [phone]      Janett Gaines  1945    68 y.o. male     Date of Visit: 4/16/2019    Last Visit: 6mo    Chief Complaint: Skin check    History of Present Illness:  1. Here for skin/mole check. No new moles. No moles changing in size, shape, color. No moles associated w/ pain, bleeding, pruritus.   -Hx moderately dysplastic nevus, L upper back - excised via biopsy 2014  -Has been trying to avoid sun and use hats, SPF 30 sunscreen more regularly     2. History of actinic keratoses s/p cryotherapy. Unsure of new lesions. 3. Hx NMSC - Butts's L temple s/p ED&C 2011, [facility] L lower cheek s/p Mohs 5/2018.   -No new lesions     4. New issue. Yesterday, awoke w/ what pt believes to be a spider bite on L cheek. Complains of severe associated pruritus. Derm History:   -Dermatitis of lower legs - TAC 0.1% crm     Review of Systems:  Constitutional: Reports general sense of well-being. Skin: No interval severe sunburns. Allergies: Reviewed and updated. Past Medical History, Surgical History, Medications and Allergies reviewed.      Past Medical History:   Diagnosis Date    Asthma, mild intermittent, well-controlled     Cancer (HCC)     skin- face and shoulder    Colon polyps     CTS (carpal tunnel syndrome)     DDD (degenerative disc disease), lumbar     Hyperlipidemia     Hypertension     Medial epicondylitis     Migraine     Situational anxiety 9/16/2014    Trigger thumb of left hand      Past Surgical History:   Procedure Laterality Date    CARPAL TUNNEL RELEASE      TONSILLECTOMY         Allergies   Allergen Reactions    Penicillins      Mother had allergies     Outpatient Medications Marked as Taking for the 4/16/19 encounter (Office Visit) with Venu Betts MD   Medication Sig Dispense Refill    amLODIPine (NORVASC) 10 MG tablet TAKE 1 TABLET BY MOUTH EVERY DAY 30 tablet 0    diclofenac (VOLTAREN) 75 MG EC tablet TAKE 1 TABLET BY MOUTH TWICE DAILY AS NEEDED FOR BACK PAIN 30 tablet 0    lisinopril (PRINIVIL;ZESTRIL) 40 MG tablet Take 1 tablet by mouth daily FOLLOW UP APPOINTMENT AND LABS ARE NEEDED. 90 tablet 0    buPROPion (WELLBUTRIN XL) 300 MG extended release tablet TAKE 1 TABLET BY MOUTH EVERY DAY IN THE MORNING 90 tablet 0    simvastatin (ZOCOR) 20 MG tablet TAKE 1 TABLET BY MOUTH EVERY NIGHT. FOLLOW UP APPOINTMENT AND LABS ARE NEEDED 90 tablet 0    buPROPion (WELLBUTRIN XL) 150 MG extended release tablet Take 1 tablet by mouth every morning 90 tablet 1    fluticasone (FLOVENT HFA) 110 MCG/ACT inhaler Inhale 2 puffs into the lungs 2 times daily 1 Inhaler 2    albuterol sulfate HFA (PROVENTIL HFA) 108 (90 Base) MCG/ACT inhaler Inhale 2 puffs into the lungs every 6 hours as needed for Wheezing 1 Inhaler 2    therapeutic multivitamin-minerals (THERAGRAN-M) tablet Take 1 tablet by mouth daily. Social history: Semi-retired     Physical Examination     The following were examined and determined to be normal: Psych/Neuro, Scalp/hair, Conjunctivae/eyelids, Gums/teeth/lips, Neck, Nails/digits and Genitalia/groin/buttocks. The following were examined and determined to be abnormal: Head/face, Breast/axilla/chest, Abdomen, Back, RUE, LUE, RLE and LLE. -General: Well-appearing, NAD  1. Scattered on the trunk and extremities are multiple well-defined round and oval symmetric smoothly-bordered uniformly brown macules and papules. 2. L 2 and R 3 temples - ill-defined irregularly-shaped roughly-scaling thin pink macule(s)/papule(s)    3. L temple, L lower cheek - scars clear  4. L lateral cheek - few cm edematous erythematous plaque     Assessment and Plan     1.  Benign acquired melanocytic nevi / hx dysplastic nevus  -Recommend monthly self skin exams   -Educated regarding the ABCDEs of melanoma detection   -Call for any new/changing moles or concerning lesions  -Reviewed sun protective behavior -- sun avoidance during the peak hours of the day, sun-protective clothing (including hat and sunglasses), sunscreen use (water resistant, broad spectrum, SPF at least 30, need for reapplication every 2 to 3 hours), avoidance of tanning beds   -Return for full skin exam in 1yr (sooner if indicated)     2. Actinic keratosis(es)  -Edu re: relationship with chronic cumulative sun exposure, low premalignant potential.   -5 lesion(s) treated w/ liquid nitrogen x 2 cycles - temples. Edu re: risk of blister formation, discomfort, scar, hypopigmentation. Discussed wound care. 3. History of NMSC - clear today     4. Arthropod bite reaction, L cheek  -Okay to use pt's triamcinolone 0.1% crm bid prn. Edu re: sparing use, atrophy, striae, hypopigmentation, telangiectasias.

## 2019-05-01 ENCOUNTER — OFFICE VISIT (OUTPATIENT)
Dept: FAMILY MEDICINE CLINIC | Age: 74
End: 2019-05-01
Payer: MEDICARE

## 2019-05-01 VITALS
DIASTOLIC BLOOD PRESSURE: 71 MMHG | HEART RATE: 74 BPM | BODY MASS INDEX: 28.32 KG/M2 | WEIGHT: 217.6 LBS | RESPIRATION RATE: 16 BRPM | TEMPERATURE: 95.3 F | SYSTOLIC BLOOD PRESSURE: 130 MMHG | OXYGEN SATURATION: 96 %

## 2019-05-01 DIAGNOSIS — F41.9 ANXIETY: ICD-10-CM

## 2019-05-01 DIAGNOSIS — E78.5 HYPERLIPIDEMIA, UNSPECIFIED HYPERLIPIDEMIA TYPE: ICD-10-CM

## 2019-05-01 DIAGNOSIS — J45.20 ASTHMA, MILD INTERMITTENT, WELL-CONTROLLED: ICD-10-CM

## 2019-05-01 DIAGNOSIS — M51.36 DDD (DEGENERATIVE DISC DISEASE), LUMBAR: ICD-10-CM

## 2019-05-01 DIAGNOSIS — I10 ESSENTIAL HYPERTENSION: Primary | ICD-10-CM

## 2019-05-01 DIAGNOSIS — Z12.5 PROSTATE CANCER SCREENING: ICD-10-CM

## 2019-05-01 PROCEDURE — G8417 CALC BMI ABV UP PARAM F/U: HCPCS | Performed by: FAMILY MEDICINE

## 2019-05-01 PROCEDURE — 99214 OFFICE O/P EST MOD 30 MIN: CPT | Performed by: FAMILY MEDICINE

## 2019-05-01 PROCEDURE — G8427 DOCREV CUR MEDS BY ELIG CLIN: HCPCS | Performed by: FAMILY MEDICINE

## 2019-05-01 PROCEDURE — 3017F COLORECTAL CA SCREEN DOC REV: CPT | Performed by: FAMILY MEDICINE

## 2019-05-01 PROCEDURE — 4040F PNEUMOC VAC/ADMIN/RCVD: CPT | Performed by: FAMILY MEDICINE

## 2019-05-01 PROCEDURE — 1123F ACP DISCUSS/DSCN MKR DOCD: CPT | Performed by: FAMILY MEDICINE

## 2019-05-01 PROCEDURE — 1036F TOBACCO NON-USER: CPT | Performed by: FAMILY MEDICINE

## 2019-05-01 RX ORDER — BUPROPION HYDROCHLORIDE 300 MG/1
TABLET ORAL
Qty: 90 TABLET | Refills: 1 | Status: SHIPPED | OUTPATIENT
Start: 2019-05-01 | End: 2019-11-25 | Stop reason: SDUPTHER

## 2019-05-01 RX ORDER — SIMVASTATIN 20 MG
TABLET ORAL
Qty: 90 TABLET | Refills: 1 | Status: SHIPPED | OUTPATIENT
Start: 2019-05-01 | End: 2019-12-02 | Stop reason: SDUPTHER

## 2019-05-01 RX ORDER — LISINOPRIL 40 MG/1
40 TABLET ORAL DAILY
Qty: 90 TABLET | Refills: 0 | Status: SHIPPED | OUTPATIENT
Start: 2019-05-01 | End: 2019-08-10 | Stop reason: SDUPTHER

## 2019-05-01 RX ORDER — DICLOFENAC SODIUM 75 MG/1
75 TABLET, DELAYED RELEASE ORAL 2 TIMES DAILY
Qty: 90 TABLET | Refills: 1 | Status: SHIPPED | OUTPATIENT
Start: 2019-05-01 | End: 2019-07-31 | Stop reason: SDUPTHER

## 2019-05-01 RX ORDER — AMLODIPINE BESYLATE 10 MG/1
TABLET ORAL
Qty: 90 TABLET | Refills: 1 | Status: SHIPPED | OUTPATIENT
Start: 2019-05-01 | End: 2022-03-16

## 2019-05-01 ASSESSMENT — PATIENT HEALTH QUESTIONNAIRE - PHQ9
SUM OF ALL RESPONSES TO PHQ9 QUESTIONS 1 & 2: 0
SUM OF ALL RESPONSES TO PHQ QUESTIONS 1-9: 0
SUM OF ALL RESPONSES TO PHQ QUESTIONS 1-9: 0
2. FEELING DOWN, DEPRESSED OR HOPELESS: 0
1. LITTLE INTEREST OR PLEASURE IN DOING THINGS: 0

## 2019-05-01 NOTE — PROGRESS NOTES
Patient is here for follow up of hypertension, hypercholesterolemia,  Asthma, and anxiety. 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 Denies chest pain or shortness of breath . Last flare of asthma was in 1/19 with snow on the ground and used Flovent with Albuterol ,but uses Albuterol prn with rainy weather. Has lost 26 lbs/ 9 months. He goes 4d/ week. Does eliptical and diet. Review of Systems    ROS: All other systems were reviewed and are negative . Patient's allergies and medications were reviewed. Patient's past medical, surgical, social , and family history were reviewed. OBJECTIVE:  /71   Pulse 74   Temp 95.3 °F (35.2 °C) (Oral)   Resp 16   Wt 217 lb 9.6 oz (98.7 kg)   SpO2 96%   BMI 28.32 kg/m²     Physical Exam    General: NAD, cooperative, alert and oriented X 3. Mood / affect is good. good insight. well hydrated. Neck : no lymphadenopathy, supple, FROM  CV: Regular rate and rhythm , no murmurs/ rub/ gallop. No edema. Lungs : CTA bilaterally, breathing comfortably  Abdomen: positive bowel sounds, soft , non tender, non distended. No hepatosplenomegaly. No CVA tenderness. Skin: no rashes. Non tender. ASSESSMENT/  PLAN:  1. Essential hypertension  - controlled. Continue Amlodipine 10 mg qd, Lisinopril 40 mg qd and low sodium diet. - Comprehensive Metabolic Panel; Future  - amLODIPine (NORVASC) 10 MG tablet; TAKE 1 TABLET BY MOUTH EVERY DAY  Dispense: 90 tablet; Refill: 1  - lisinopril (PRINIVIL;ZESTRIL) 40 MG tablet; Take 1 tablet by mouth daily  Dispense: 90 tablet; Refill: 0    2. Hyperlipidemia, unspecified hyperlipidemia type  - controlled and continue Simvastatin 20 mg qd and low cholesterol diet. - Comprehensive Metabolic Panel; Future, Lipid Panel; Future  - simvastatin (ZOCOR) 20 MG tablet; TAKE 1 TABLET BY MOUTH EVERY NIGHT. Dispense: 90 tablet; Refill: 1    3.  DDD (degenerative disc disease), lumbar  - stable. Moist heat . ROM exercises. Modify activities. - Comprehensive Metabolic Panel; Future    4. Asthma, mild intermittent, well-controlled  - stable. Continue Flovent bid. Albuterol HFA prn.     5. Anxiety  - stable. Continue Wellbutrin  mg qd. 6. Prostate cancer screening  - PSA, Prostatic Specific Antigen; Future    Follow up 6 months/ prn.

## 2019-05-08 DIAGNOSIS — Z12.11 COLON CANCER SCREENING: Primary | ICD-10-CM

## 2019-05-08 LAB
CONTROL: NORMAL
HEMOCCULT STL QL: NORMAL

## 2019-05-08 PROCEDURE — 82274 ASSAY TEST FOR BLOOD FECAL: CPT | Performed by: FAMILY MEDICINE

## 2019-07-17 DIAGNOSIS — J45.909 UNCOMPLICATED ASTHMA, UNSPECIFIED ASTHMA SEVERITY, UNSPECIFIED WHETHER PERSISTENT: ICD-10-CM

## 2019-07-17 RX ORDER — ALBUTEROL SULFATE 90 UG/1
AEROSOL, METERED RESPIRATORY (INHALATION)
Qty: 18 INHALER | Refills: 2 | Status: SHIPPED | OUTPATIENT
Start: 2019-07-17 | End: 2019-12-02 | Stop reason: SDUPTHER

## 2019-07-31 RX ORDER — DICLOFENAC SODIUM 75 MG/1
TABLET, DELAYED RELEASE ORAL
Qty: 180 TABLET | Refills: 0 | Status: SHIPPED | OUTPATIENT
Start: 2019-07-31 | End: 2019-12-02 | Stop reason: SDUPTHER

## 2019-08-10 DIAGNOSIS — I10 ESSENTIAL HYPERTENSION: ICD-10-CM

## 2019-08-12 RX ORDER — LISINOPRIL 40 MG/1
TABLET ORAL
Qty: 90 TABLET | Refills: 0 | Status: SHIPPED | OUTPATIENT
Start: 2019-08-12 | End: 2019-11-23 | Stop reason: SDUPTHER

## 2019-11-18 ENCOUNTER — TELEPHONE (OUTPATIENT)
Dept: DERMATOLOGY | Age: 74
End: 2019-11-18

## 2019-11-18 RX ORDER — TRIAMCINOLONE ACETONIDE 1 MG/G
CREAM TOPICAL
Qty: 80 G | Refills: 0 | Status: SHIPPED | OUTPATIENT
Start: 2019-11-18 | End: 2020-10-14 | Stop reason: ALTCHOICE

## 2019-11-23 DIAGNOSIS — I10 ESSENTIAL HYPERTENSION: ICD-10-CM

## 2019-11-25 RX ORDER — BUPROPION HYDROCHLORIDE 300 MG/1
TABLET ORAL
Qty: 90 TABLET | Refills: 1 | Status: SHIPPED | OUTPATIENT
Start: 2019-11-25 | End: 2020-03-26

## 2019-11-25 RX ORDER — LISINOPRIL 40 MG/1
TABLET ORAL
Qty: 90 TABLET | Refills: 0 | Status: SHIPPED | OUTPATIENT
Start: 2019-11-25 | End: 2019-12-02 | Stop reason: SDUPTHER

## 2019-12-02 ENCOUNTER — OFFICE VISIT (OUTPATIENT)
Dept: FAMILY MEDICINE CLINIC | Age: 74
End: 2019-12-02
Payer: MEDICARE

## 2019-12-02 VITALS
WEIGHT: 215.6 LBS | TEMPERATURE: 96.2 F | HEART RATE: 75 BPM | RESPIRATION RATE: 12 BRPM | BODY MASS INDEX: 29.2 KG/M2 | DIASTOLIC BLOOD PRESSURE: 82 MMHG | SYSTOLIC BLOOD PRESSURE: 132 MMHG | OXYGEN SATURATION: 99 % | HEIGHT: 72 IN

## 2019-12-02 DIAGNOSIS — J45.20 ASTHMA, MILD INTERMITTENT, WELL-CONTROLLED: ICD-10-CM

## 2019-12-02 DIAGNOSIS — F41.9 ANXIETY: ICD-10-CM

## 2019-12-02 DIAGNOSIS — Z12.5 PROSTATE CANCER SCREENING: ICD-10-CM

## 2019-12-02 DIAGNOSIS — M51.36 DDD (DEGENERATIVE DISC DISEASE), LUMBAR: ICD-10-CM

## 2019-12-02 DIAGNOSIS — I10 ESSENTIAL HYPERTENSION: Primary | ICD-10-CM

## 2019-12-02 DIAGNOSIS — E78.5 HYPERLIPIDEMIA, UNSPECIFIED HYPERLIPIDEMIA TYPE: ICD-10-CM

## 2019-12-02 PROCEDURE — 1123F ACP DISCUSS/DSCN MKR DOCD: CPT | Performed by: FAMILY MEDICINE

## 2019-12-02 PROCEDURE — 3017F COLORECTAL CA SCREEN DOC REV: CPT | Performed by: FAMILY MEDICINE

## 2019-12-02 PROCEDURE — 99214 OFFICE O/P EST MOD 30 MIN: CPT | Performed by: FAMILY MEDICINE

## 2019-12-02 PROCEDURE — G8417 CALC BMI ABV UP PARAM F/U: HCPCS | Performed by: FAMILY MEDICINE

## 2019-12-02 PROCEDURE — G8427 DOCREV CUR MEDS BY ELIG CLIN: HCPCS | Performed by: FAMILY MEDICINE

## 2019-12-02 PROCEDURE — 1036F TOBACCO NON-USER: CPT | Performed by: FAMILY MEDICINE

## 2019-12-02 PROCEDURE — 4040F PNEUMOC VAC/ADMIN/RCVD: CPT | Performed by: FAMILY MEDICINE

## 2019-12-02 PROCEDURE — G8484 FLU IMMUNIZE NO ADMIN: HCPCS | Performed by: FAMILY MEDICINE

## 2019-12-02 RX ORDER — DICLOFENAC SODIUM 75 MG/1
TABLET, DELAYED RELEASE ORAL
Qty: 180 TABLET | Refills: 0 | Status: SHIPPED | OUTPATIENT
Start: 2019-12-02 | End: 2020-07-10

## 2019-12-02 RX ORDER — LISINOPRIL 40 MG/1
TABLET ORAL
Qty: 90 TABLET | Refills: 1 | Status: SHIPPED | OUTPATIENT
Start: 2019-12-02 | End: 2020-09-17

## 2019-12-02 RX ORDER — ALBUTEROL SULFATE 90 UG/1
AEROSOL, METERED RESPIRATORY (INHALATION)
Qty: 1 INHALER | Refills: 2 | Status: SHIPPED | OUTPATIENT
Start: 2019-12-02 | End: 2022-05-11 | Stop reason: SDUPTHER

## 2019-12-02 RX ORDER — SIMVASTATIN 20 MG
TABLET ORAL
Qty: 90 TABLET | Refills: 1 | Status: SHIPPED | OUTPATIENT
Start: 2019-12-02 | End: 2020-06-26

## 2019-12-02 RX ORDER — AMLODIPINE BESYLATE 10 MG/1
TABLET ORAL
Qty: 90 TABLET | Refills: 1 | Status: SHIPPED | OUTPATIENT
Start: 2019-12-02 | End: 2020-08-21

## 2019-12-03 LAB
ALBUMIN SERPL-MCNC: 4.3 G/DL (ref 3.5–5)
ALP BLD-CCNC: 56 IU/L (ref 35–135)
ALT SERPL-CCNC: 36 IU/L (ref 10–60)
ANION GAP SERPL CALCULATED.3IONS-SCNC: 7 MMOL/L (ref 6–18)
AST SERPL-CCNC: 26 IU/L (ref 10–40)
BILIRUB SERPL-MCNC: 0.9 MG/DL (ref 0–1.2)
BUN BLDV-MCNC: 15 MG/DL (ref 8–26)
CALCIUM SERPL-MCNC: 9.4 MG/DL (ref 8.5–10.5)
CHLORIDE BLD-SCNC: 105 MEQ/L (ref 101–111)
CHOLESTEROL, TOTAL: 141 MG/DL
CHOLESTEROL: 108 MG/DL
CO2: 27 MMOL/L (ref 24–36)
CREAT SERPL-MCNC: 1.02 MG/DL (ref 0.64–1.27)
GFR AFRICAN AMERICAN: 82 ML/MIN/1.73 M2
GFR NON-AFRICAN AMERICAN: 71 ML/MIN/1.73 M2
GLUCOSE BLD-MCNC: 94 MG/DL (ref 70–99)
HDLC SERPL-MCNC: 33 MG/DL
LDL CHOLESTEROL CALCULATED: 95 MG/DL
POTASSIUM SERPL-SCNC: 4 MEQ/L (ref 3.6–5.1)
PROSTATE SPECIFIC ANTIGEN: 2.17 NG/ML
SODIUM BLD-SCNC: 139 MEQ/L (ref 135–145)
TOTAL PROTEIN: 6.5 G/DL (ref 6–8)
TRIGL SERPL-MCNC: 63 MG/DL

## 2020-03-26 RX ORDER — BUPROPION HYDROCHLORIDE 300 MG/1
TABLET ORAL
Qty: 90 TABLET | Refills: 0 | Status: SHIPPED | OUTPATIENT
Start: 2020-03-26 | End: 2020-06-19

## 2020-06-19 RX ORDER — BUPROPION HYDROCHLORIDE 300 MG/1
TABLET ORAL
Qty: 90 TABLET | Refills: 0 | Status: SHIPPED | OUTPATIENT
Start: 2020-06-19 | End: 2020-09-21

## 2020-06-26 RX ORDER — SIMVASTATIN 20 MG
TABLET ORAL
Qty: 90 TABLET | Refills: 0 | Status: SHIPPED | OUTPATIENT
Start: 2020-06-26 | End: 2020-09-21

## 2020-06-26 NOTE — TELEPHONE ENCOUNTER
12/2/19-last ov  No upcoming ov  12/3/19- Last lipid    Medication and pharmacy is listed.  Please review

## 2020-07-10 RX ORDER — DICLOFENAC SODIUM 75 MG/1
TABLET, DELAYED RELEASE ORAL
Qty: 180 TABLET | Refills: 0 | Status: SHIPPED | OUTPATIENT
Start: 2020-07-10 | End: 2020-10-08

## 2020-08-21 RX ORDER — AMLODIPINE BESYLATE 10 MG/1
TABLET ORAL
Qty: 90 TABLET | Refills: 0 | Status: SHIPPED | OUTPATIENT
Start: 2020-08-21 | End: 2020-12-01

## 2020-08-21 NOTE — TELEPHONE ENCOUNTER
Requested Prescriptions     Pending Prescriptions Disp Refills    amLODIPine (NORVASC) 10 MG tablet [Pharmacy Med Name: AMLODIPINE BESYLATE 10 MG TAB] 90 tablet 1     Sig: TAKE 1 TABLET BY MOUTH EVERY DAY     Amauri Galaviz

## 2020-09-17 RX ORDER — LISINOPRIL 40 MG/1
TABLET ORAL
Qty: 90 TABLET | Refills: 0 | Status: SHIPPED | OUTPATIENT
Start: 2020-09-17 | End: 2020-12-09 | Stop reason: SDUPTHER

## 2020-09-17 NOTE — TELEPHONE ENCOUNTER
RX sent. Please facilitate scheduling a follow up appointment . Secondary to adenotonsillar hypertrophy  Pt is s/p adenotonsillectomy, pod #0  Zofran 3mg IVP q6h prn for nausea/vomiting  Ibuprofen 300mg po q6h prn for moderate pain  Acetaminophen 320mg po q4h for mild pain  Regular diet  Continuous pulse ox  Likely discharge tomorrow Secondary to adenotonsillar hypertrophy  Pt is s/p adenotonsillectomy, pod #0  Zofran 3mg IVP q6h prn for nausea/vomiting  Ibuprofen 300mg po q6h prn for moderate pain  Acetaminophen 320mg po q4h for mild pain  Regular diet  Continuous pulse ox  Likely discharge tomorrow morning if no desats and tolerating PO

## 2020-09-18 NOTE — TELEPHONE ENCOUNTER
Advised pt wife,   They will be leaving for vacation and will be back 10/9/2020 and will call and schedule then  Kash

## 2020-09-21 RX ORDER — BUPROPION HYDROCHLORIDE 300 MG/1
TABLET ORAL
Qty: 90 TABLET | Refills: 0 | Status: SHIPPED | OUTPATIENT
Start: 2020-09-21 | End: 2020-12-23

## 2020-09-21 RX ORDER — SIMVASTATIN 20 MG
TABLET ORAL
Qty: 90 TABLET | Refills: 0 | Status: SHIPPED | OUTPATIENT
Start: 2020-09-21 | End: 2020-12-29

## 2020-09-21 NOTE — TELEPHONE ENCOUNTER
Requested Prescriptions     Pending Prescriptions Disp Refills    buPROPion (WELLBUTRIN XL) 300 MG extended release tablet [Pharmacy Med Name: BUPROPION HCL  MG TABLET] 90 tablet 0     Sig: TAKE 1 TABLET BY MOUTH EVERY DAY IN THE MORNING     LOV:12/2/2019  FOV:10/14/2020

## 2020-10-08 RX ORDER — DICLOFENAC SODIUM 75 MG/1
TABLET, DELAYED RELEASE ORAL
Qty: 180 TABLET | Refills: 0 | Status: SHIPPED | OUTPATIENT
Start: 2020-10-08 | End: 2021-06-11

## 2020-10-08 NOTE — TELEPHONE ENCOUNTER
Requested Prescriptions     Pending Prescriptions Disp Refills    diclofenac (VOLTAREN) 75 MG EC tablet [Pharmacy Med Name: DICLOFENAC SOD EC 75 MG TAB] 180 tablet 0     Sig: TAKE 1 TAB BY MOUTH 2 TIMES DAILY AS NEEDED FOR BACK PAIN     LOV:12/2/2019  FOV:10/14/2020

## 2020-10-14 ENCOUNTER — OFFICE VISIT (OUTPATIENT)
Dept: FAMILY MEDICINE CLINIC | Age: 75
End: 2020-10-14
Payer: MEDICARE

## 2020-10-14 VITALS
OXYGEN SATURATION: 98 % | BODY MASS INDEX: 30.63 KG/M2 | DIASTOLIC BLOOD PRESSURE: 72 MMHG | HEIGHT: 71 IN | RESPIRATION RATE: 12 BRPM | HEART RATE: 77 BPM | WEIGHT: 218.8 LBS | SYSTOLIC BLOOD PRESSURE: 138 MMHG | TEMPERATURE: 98 F

## 2020-10-14 PROCEDURE — 4040F PNEUMOC VAC/ADMIN/RCVD: CPT | Performed by: FAMILY MEDICINE

## 2020-10-14 PROCEDURE — G0439 PPPS, SUBSEQ VISIT: HCPCS | Performed by: FAMILY MEDICINE

## 2020-10-14 PROCEDURE — 3017F COLORECTAL CA SCREEN DOC REV: CPT | Performed by: FAMILY MEDICINE

## 2020-10-14 PROCEDURE — G8482 FLU IMMUNIZE ORDER/ADMIN: HCPCS | Performed by: FAMILY MEDICINE

## 2020-10-14 PROCEDURE — 1123F ACP DISCUSS/DSCN MKR DOCD: CPT | Performed by: FAMILY MEDICINE

## 2020-10-14 ASSESSMENT — PATIENT HEALTH QUESTIONNAIRE - PHQ9
SUM OF ALL RESPONSES TO PHQ QUESTIONS 1-9: 0
2. FEELING DOWN, DEPRESSED OR HOPELESS: 0
SUM OF ALL RESPONSES TO PHQ9 QUESTIONS 1 & 2: 0
1. LITTLE INTEREST OR PLEASURE IN DOING THINGS: 0

## 2020-10-14 NOTE — PROGRESS NOTES
Medicare Annual Wellness Visit  Name: Rell Hoskins Date: 10/14/2020   MRN: <[id number]> Sex: Male   Age: 77 y.o. Ethnicity: Non-/Non    : 1945 Race: Joe Tucker is here for Medicare AWV    Screenings for behavioral, psychosocial and functional/safety risks, and cognitive dysfunction are all negative except as indicated below. These results, as well as other patient data from the [address] form, are documented in Flowsheets linked to this Encounter. He has been at [email or web address] [facility] [employer] 5 years. He was off 7 weeks with [email or web address] with [employer]. Former smoker in his younger days. Denies fever, chest pain , shortness of breath or cough. Allergies   Allergen Reactions    Penicillins      Mother had allergies       Prior to Visit Medications    Medication Sig Taking? Authorizing Provider   diclofenac (VOLTAREN) 75 MG EC tablet TAKE 1 TAB BY MOUTH 2 TIMES DAILY AS NEEDED FOR BACK PAIN Yes Trino Torre MD   simvastatin (ZOCOR) 20 MG tablet TAKE 1 TABLET BY MOUTH EVERY DAY AT NIGHT. FOLLOW UP APPOINTMENT/LABS NEEDED. Yes Trino Torre MD   buPROPion (WELLBUTRIN XL) 300 MG extended release tablet TAKE 1 TABLET BY MOUTH EVERY DAY IN THE MORNING Yes Trino Torre MD   lisinopril (PRINIVIL;ZESTRIL) 40 MG tablet TAKE 1 TABLET BY MOUTH EVERY DAY. FOLLOW UP APPOINTMENT AND LABS ARE NEEDED. Yes Trino Torre MD   albuterol sulfate  (90 Base) MCG/ACT inhaler INHALE 2 PUFFS INTO LUNGS EVERY 6 HOURS AS NEEDED FOR WHEEZING Yes Trino Torre MD   amLODIPine (NORVASC) 10 MG tablet TAKE 1 TABLET BY MOUTH EVERY DAY Yes Trino Torre MD   therapeutic multivitamin-minerals [facility] SYSTEM) tablet Take 1 tablet by mouth daily. Yes Historical Provider, MD   amLODIPine (NORVASC) 10 MG tablet TAKE 1 TABLET BY MOUTH EVERY DAY. FOLLOW UP APPOINTMENT AND LABS ARE NEEDED.   Patient not taking: Reported on 10/14/2020  Trino Torre MD   triamcinolone (KENALOG) 0.1 % cream recent and remote memory intact. General Appearance: alert and oriented to person, place and time, well developed and well- nourished, in no acute distress  Skin: warm and dry, no rash or erythema  Eyes: pupils equal, round, and reactive to light, extraocular eye movements intact, conjunctivae normal  ENT: tympanic membrane, external ear and ear canal normal bilaterally, nose without deformity, nasal mucosa and turbinates normal without polyps  Neck: supple and non-tender without mass, no thyromegaly or thyroid nodules, no cervical lymphadenopathy  Pulmonary/Chest: clear to auscultation bilaterally- no wheezes, rales or rhonchi, normal air movement, no respiratory distress  Cardiovascular: normal rate, regular rhythm, normal S1 and S2, no murmurs, rubs, clicks, or gallops, distal pulses intact, no carotid bruits  Abdomen: soft, non-tender, non-distended, normal bowel sounds, no masses or organomegaly  Extremities: no cyanosis, clubbing or edema  Musculoskeletal: normal range of motion, no joint swelling, deformity or tenderness  Neurologic: reflexes normal and symmetric, no cranial nerve deficit, gait, coordination and speech normal    Patient's complete Health Risk Assessment and screening values have been reviewed and are found in Flowsheets. The following problems were reviewed today and where indicated follow up appointments were made and/or referrals ordered. Positive Risk Factor Screenings with Interventions:     General Health and ACP:  General  In general, how would you say your health is?: Excellent  In the past 7 days, have you experienced any of the following?  New or Increased Pain, New or Increased Fatigue, Loneliness, Social Isolation, Stress or Anger?: None of These  Do you get the social and emotional support that you need?: Yes  Do you have a Living Will?: Yes  Advance Directives     Power of [address] [place] [address] Will ACP-Advance Directive ACP-Power of     Not on File Not on File [place] [facility] Health Risk Interventions:  · continue yearly eye exam and dental exam.    Health Habits/Nutrition:  Health Habits/Nutrition  Do you exercise for at least 20 minutes 2-3 times per week?: Yes  Have you lost any weight without trying in the past 3 months?: No  Do you eat fewer than 2 meals per day?: No  Have you seen a dentist within the past year?: Yes  Body mass index: (!) 30.73  Health Habits/Nutrition Interventions:  · Continue CV exercise > 150 minutes/ week. Personalized Preventive Plan   Current Health Maintenance Status  Immunization History   Administered Date(s) Administered    Influenza Vaccine, unspecified formulation 11/20/2016    Influenza Virus Vaccine 10/22/2019    Influenza, High Dose (Fluzone 65 yrs and older) 10/09/2014, 11/11/2015, 10/16/2018, 09/01/2020    Pneumococcal Conjugate 13-valent ([facility]) 11/11/2015, 09/01/2020    Pneumococcal Polysaccharide ([id number]) 10/01/2003, 08/29/2011    Tdap (Boostrix, Adacel) 01/25/2008    Zoster Live (Zostavax) 07/22/2013        Health Maintenance   Topic Date Due    AAA screen  1945    Shingles Vaccine (2 of 3) 09/16/2013    Colon cancer screen colonoscopy  06/21/2016    DTaP/Tdap/Td vaccine (2 - Td) 01/25/2018    Annual Wellness Visit (AWV)  05/29/2019    Lipid screen  12/03/2020    Potassium monitoring  12/03/2020    Creatinine monitoring  12/03/2020    Flu vaccine  Completed    Pneumococcal 65+ years Vaccine  Completed    Hepatitis C screen  Completed    Hepatitis A vaccine  Aged Out    Hepatitis B vaccine  Aged Out    Hib vaccine  Aged Out    Meningococcal (ACWY) vaccine  Aged Out     Recommendations for [employer] Due: see orders and patient instructions/AVS.  . Recommended screening schedule for the next 5-10 years is provided to the patient in written form: see Patient Instructions/AVS.    Jama Erazo was seen today for medicare awv. Diagnoses and all orders for this visit:    1.  Routine general medical examination at a health care facility  - Lipid Panel; Future  - Comprehensive Metabolic Panel; Future    2. Essential hypertension  - Controlled. Continue Lisinopril 40 mg qd, Amlodipine 10 mg qd and low sodium diet. - Comprehensive Metabolic Panel; Future    3. Hyperlipidemia, unspecified hyperlipidemia type  - Controlled. Continue Simvastatin 20 mg qd and low cholesterol diet. - Lipid Panel; Future  - Comprehensive Metabolic Panel; Future    4. Asthma, mild intermittent, well-controlled  - stable. Not needing Flovent. Albuterol HFA prn.     5. DDD (degenerative disc disease), lumbar  - Moist heat . ROM exercises. Modify activities. - Stable. 6. Anxiety  - Stable. Continue Wellbutrin  mg qd.     7. Cancer ([address]- Skin   - recommended daily use of sunscreen. 6. Former smoker  - VL AAA SCREENING; Future and follow up after completed/ prn.     9. Colon cancer screening  - POCT Fecal Immunochemical Test (FIT); Future    Follow up 6 months/ prn.

## 2020-10-14 NOTE — PATIENT INSTRUCTIONS
Personalized Preventive Plan for Mohamud Anton - 10/14/2020  Medicare offers a range of preventive health benefits. Some of the tests and screenings are paid in full while other may be subject to a deductible, co-insurance, and/or copay. Some of these benefits include a comprehensive review of your medical history including lifestyle, illnesses that may run in your family, and various assessments and screenings as appropriate. After reviewing your medical record and screening and assessments performed today your provider may have ordered immunizations, labs, imaging, and/or referrals for you. A list of these orders (if applicable) as well as your Preventive Care list are included within your After Visit Summary for your review. Other Preventive Recommendations:    · A preventive eye exam performed by an eye specialist is recommended every 1-2 years to screen for glaucoma; cataracts, macular degeneration, and other eye disorders. · A preventive dental visit is recommended every 6 months. · Try to get at least 150 minutes of exercise per week or 10,000 steps per day on a pedometer . · Order or download the FREE \"Exercise & Physical Activity: Your Everyday Guide\" from [employer]. Call 1-654.427.4115 or search [employer] online. · You need 2013-2520 mg of calcium and 4571-8164 IU of vitamin D per day. It is possible to meet your calcium requirement with diet alone, but a vitamin D supplement is usually necessary to meet this goal.  · When exposed to the sun, use a sunscreen that protects against both UVA and UVB radiation with an SPF of 30 or greater. Reapply every 2 to 3 hours or after sweating, drying off with a towel, or swimming. · Always wear a seat belt when traveling in a car. Always wear a helmet when riding a bicycle or motorcycle.

## 2020-10-27 LAB
ALBUMIN SERPL-MCNC: 4.2 G/DL (ref 3.5–5.7)
ALP BLD-CCNC: 61 IU/L (ref 35–135)
ALT SERPL-CCNC: 25 IU/L (ref 10–60)
ANION GAP SERPL CALCULATED.3IONS-SCNC: 5 MMOL/L (ref 6–18)
AST SERPL-CCNC: 18 IU/L (ref 10–40)
BILIRUB SERPL-MCNC: 0.5 MG/DL (ref 0–1.2)
BUN BLDV-MCNC: 14 MG/DL (ref 8–26)
CALCIUM SERPL-MCNC: 9 MG/DL (ref 8.5–10.4)
CHLORIDE BLD-SCNC: 108 MEQ/L (ref 98–111)
CHOLESTEROL, TOTAL: 140 MG/DL
CO2: 26 MMOL/L (ref 21–31)
CREAT SERPL-MCNC: 0.93 MG/DL (ref 0.7–1.3)
GFR AFRICAN AMERICAN: 91 ML/MIN/1.73 M2
GFR NON-AFRICAN AMERICAN: 79 ML/MIN/1.73 M2
GLUCOSE BLD-MCNC: 97 MG/DL (ref 70–99)
HDLC SERPL-MCNC: 36 MG/DL
LDL CHOLESTEROL CALCULATED: 87 MG/DL
NONHDLC SERPL-MCNC: 104 MG/DL
POTASSIUM SERPL-SCNC: 4.2 MEQ/L (ref 3.6–5.1)
SODIUM BLD-SCNC: 139 MEQ/L (ref 135–145)
TOTAL PROTEIN: 6.4 G/DL (ref 6–8)
TRIGL SERPL-MCNC: 85 MG/DL

## 2020-10-29 PROCEDURE — 82274 ASSAY TEST FOR BLOOD FECAL: CPT | Performed by: FAMILY MEDICINE

## 2020-11-05 LAB
CONTROL: NORMAL
HEMOCCULT STL QL: NEGATIVE

## 2020-12-01 RX ORDER — AMLODIPINE BESYLATE 10 MG/1
TABLET ORAL
Qty: 90 TABLET | Refills: 1 | Status: SHIPPED | OUTPATIENT
Start: 2020-12-01 | End: 2021-06-07

## 2020-12-09 RX ORDER — LISINOPRIL 40 MG/1
TABLET ORAL
Qty: 90 TABLET | Refills: 1 | Status: SHIPPED | OUTPATIENT
Start: 2020-12-09 | End: 2021-06-08

## 2020-12-09 NOTE — TELEPHONE ENCOUNTER
Requested Prescriptions     Pending Prescriptions Disp Refills    lisinopril (PRINIVIL;ZESTRIL) 40 MG tablet 90 tablet 0     Sig: TAKE 1 TABLET BY MOUTH EVERY DAY.  FOLLOW UP APPOINTMENT AND LABS ARE NEEDED.     Jovanni Wright

## 2020-12-23 RX ORDER — BUPROPION HYDROCHLORIDE 300 MG/1
TABLET ORAL
Qty: 90 TABLET | Refills: 0 | Status: SHIPPED | OUTPATIENT
Start: 2020-12-23 | End: 2021-03-30

## 2020-12-23 NOTE — TELEPHONE ENCOUNTER
Requested Prescriptions     Pending Prescriptions Disp Refills    buPROPion (WELLBUTRIN XL) 300 MG extended release tablet [Pharmacy Med Name: BUPROPION HCL  MG TABLET] 90 tablet 0     Sig: TAKE 1 TABLET BY MOUTH EVERY DAY IN THE MORNING     Last ov  10/14/20  Last labs 10/27/20

## 2020-12-29 RX ORDER — SIMVASTATIN 20 MG
TABLET ORAL
Qty: 90 TABLET | Refills: 0 | Status: SHIPPED | OUTPATIENT
Start: 2020-12-29 | End: 2021-03-22

## 2021-02-05 ENCOUNTER — IMMUNIZATION (OUTPATIENT)
Dept: FAMILY MEDICINE CLINIC | Age: 76
End: 2021-02-05
Payer: MEDICARE

## 2021-02-05 PROCEDURE — 91301 COVID-19, MODERNA VACCINE 100MCG/0.5ML DOSE: CPT | Performed by: FAMILY MEDICINE

## 2021-02-05 PROCEDURE — 0011A COVID-19, MODERNA VACCINE 100MCG/0.5ML DOSE: CPT | Performed by: FAMILY MEDICINE

## 2021-02-16 NOTE — PROGRESS NOTES
[facility]) Dermatology  José Llamas MD  [phone]      Sarika Elizondo  1945    77 y.o. male     Date of Visit: 2/17/2021    Last Visit: 2yrs    Chief Complaint: Skin check    History of Present Illness:  1. Here for skin/mole check. No new moles. No moles changing in size, shape, color. No moles associated w/ pain, bleeding, pruritus.   -Hx moderately dysplastic nevus, L upper back - excised via biopsy 2014  -Has been trying to avoid sun and use hats, SPF 30 sunscreen more regularly     2. History of actinic keratoses s/p cryotherapy. Unsure of new lesions. 3. Hx NMSC - Butts's L temple s/p ED&C 2011, [facility] L lower cheek s/p Mohs 5/2018.   -No new lesions     4. Complains of a persistent asymptomatic lesion on R thigh     Derm History:   -Dermatitis of lower legs - TAC 0.1% crm     Review of Systems:  Constitutional: Reports general sense of well-being. Skin: No interval severe sunburns. Allergies: Reviewed and updated. Past Medical History, Surgical History, Medications and Allergies reviewed. Past Medical History:   Diagnosis Date    Asthma, mild intermittent, well-controlled     Cancer (HCC)     skin- face and shoulder    Colon polyps     CTS (carpal tunnel syndrome)     DDD (degenerative disc disease), lumbar     Hyperlipidemia     Hypertension     Medial epicondylitis     Migraine     Situational anxiety 9/16/2014    Trigger thumb of left hand      Past Surgical History:   Procedure Laterality Date    CARPAL TUNNEL RELEASE      TONSILLECTOMY         Allergies   Allergen Reactions    Penicillins      Mother had allergies     Outpatient Medications Marked as Taking for the 2/17/21 encounter (Office Visit) with José Llamas MD   Medication Sig Dispense Refill    simvastatin (ZOCOR) 20 MG tablet TAKE 1 TABLET BY MOUTH EVERY DAY AT NIGHT.  90 tablet 0    buPROPion (WELLBUTRIN XL) 300 MG extended release tablet TAKE 1 TABLET BY MOUTH EVERY DAY IN THE MORNING 90 tablet 0 -Reviewed sun protective behavior -- sun avoidance during the peak hours of the day, sun-protective clothing (including hat and sunglasses), OTC sunscreen use (water resistant, broad spectrum, SPF at least 30, need for reapplication every 2 to 3 hours), avoidance of tanning beds   -Return for full skin exam in 1yr (sooner if indicated)     2. Actinic keratosis(es)  -Edu re: relationship with chronic cumulative sun exposure, low premalignant potential.   -15 lesion(s) treated w/ liquid nitrogen x 2 cycles - R 1 and L 2 forearms, nasal bridge 1, vertex scalp 1, L 5 and R 3 temples, R cheek 2. Edu re: risk of blister formation, discomfort, scar, hypopigmentation. Discussed wound care w/ vaseline or Aquaphor. 3. History of NMSC - clear today     4.  Soft fibroma, R posterior thigh   -Reassurance re: benignity

## 2021-02-17 ENCOUNTER — OFFICE VISIT (OUTPATIENT)
Dept: DERMATOLOGY | Age: 76
End: 2021-02-17
Payer: MEDICARE

## 2021-02-17 VITALS — TEMPERATURE: 97.9 F

## 2021-02-17 DIAGNOSIS — L57.0 ACTINIC KERATOSES: ICD-10-CM

## 2021-02-17 DIAGNOSIS — Z12.83 SCREENING EXAM FOR SKIN CANCER: ICD-10-CM

## 2021-02-17 DIAGNOSIS — Z85.828 HISTORY OF NONMELANOMA SKIN CANCER: ICD-10-CM

## 2021-02-17 DIAGNOSIS — Z86.018 HISTORY OF DYSPLASTIC NEVUS: ICD-10-CM

## 2021-02-17 DIAGNOSIS — D22.9 MULTIPLE BENIGN NEVI: Primary | ICD-10-CM

## 2021-02-17 DIAGNOSIS — D17.9 SOFT FIBROMA: ICD-10-CM

## 2021-02-17 PROCEDURE — 17004 DESTROY PREMAL LESIONS 15/>: CPT | Performed by: DERMATOLOGY

## 2021-02-17 PROCEDURE — 3017F COLORECTAL CA SCREEN DOC REV: CPT | Performed by: DERMATOLOGY

## 2021-02-17 PROCEDURE — 1123F ACP DISCUSS/DSCN MKR DOCD: CPT | Performed by: DERMATOLOGY

## 2021-02-17 PROCEDURE — G8482 FLU IMMUNIZE ORDER/ADMIN: HCPCS | Performed by: DERMATOLOGY

## 2021-02-17 PROCEDURE — G8427 DOCREV CUR MEDS BY ELIG CLIN: HCPCS | Performed by: DERMATOLOGY

## 2021-02-17 PROCEDURE — 99213 OFFICE O/P EST LOW 20 MIN: CPT | Performed by: DERMATOLOGY

## 2021-02-17 PROCEDURE — 1036F TOBACCO NON-USER: CPT | Performed by: DERMATOLOGY

## 2021-02-17 PROCEDURE — 4040F PNEUMOC VAC/ADMIN/RCVD: CPT | Performed by: DERMATOLOGY

## 2021-02-17 PROCEDURE — G8417 CALC BMI ABV UP PARAM F/U: HCPCS | Performed by: DERMATOLOGY

## 2021-03-05 ENCOUNTER — IMMUNIZATION (OUTPATIENT)
Dept: FAMILY MEDICINE CLINIC | Age: 76
End: 2021-03-05
Payer: MEDICARE

## 2021-03-05 PROCEDURE — 0012A COVID-19, MODERNA VACCINE 100MCG/0.5ML DOSE: CPT | Performed by: NURSE PRACTITIONER

## 2021-03-05 PROCEDURE — 91301 COVID-19, MODERNA VACCINE 100MCG/0.5ML DOSE: CPT | Performed by: NURSE PRACTITIONER

## 2021-03-22 DIAGNOSIS — E78.5 HYPERLIPIDEMIA, UNSPECIFIED HYPERLIPIDEMIA TYPE: ICD-10-CM

## 2021-03-22 RX ORDER — SIMVASTATIN 20 MG
TABLET ORAL
Qty: 90 TABLET | Refills: 0 | Status: SHIPPED | OUTPATIENT
Start: 2021-03-22 | End: 2021-06-21

## 2021-03-30 RX ORDER — BUPROPION HYDROCHLORIDE 300 MG/1
TABLET ORAL
Qty: 90 TABLET | Refills: 0 | Status: SHIPPED | OUTPATIENT
Start: 2021-03-30 | End: 2021-04-05 | Stop reason: SDUPTHER

## 2021-03-30 NOTE — TELEPHONE ENCOUNTER
Requested Prescriptions     Pending Prescriptions Disp Refills    buPROPion (WELLBUTRIN XL) 300 MG extended release tablet [Pharmacy Med Name: BUPROPION HCL  MG TABLET] 90 tablet 0     Sig: TAKE 1 TABLET BY MOUTH EVERY DAY IN THE MORNING      PLEASE ADVISE  Sam Gunter

## 2021-04-01 ENCOUNTER — TELEPHONE (OUTPATIENT)
Dept: FAMILY MEDICINE CLINIC | Age: 76
End: 2021-04-01

## 2021-04-01 NOTE — TELEPHONE ENCOUNTER
----- Message from Vijaya Coffey sent at 3/29/2021  9:35 AM EDT -----  Subject: Refill Request    QUESTIONS  Name of Medication? buPROPion (WELLBUTRIN XL) 300 MG extended release   tablet  Patient-reported dosage and instructions? Once Daily   How many days do you have left? 0  Preferred Pharmacy? [facility]/PHARMACY #[id number]  Pharmacy phone number (if available)? [phone]  Additional Information for Provider? Pt has taken last pill this morning   3/29. Pt needs this refilled immediately. Please call pt when script is   sent in   ---------------------------------------------------------------------------  --------------  [address]  What is the best way for the office to contact you? OK to leave message on   voicemail  Preferred Call Back Phone Number?  [phone]

## 2021-04-05 RX ORDER — BUPROPION HYDROCHLORIDE 300 MG/1
TABLET ORAL
Qty: 90 TABLET | Refills: 1 | Status: SHIPPED | OUTPATIENT
Start: 2021-04-05 | End: 2021-12-06

## 2021-06-07 DIAGNOSIS — I10 ESSENTIAL HYPERTENSION: ICD-10-CM

## 2021-06-07 RX ORDER — AMLODIPINE BESYLATE 10 MG/1
TABLET ORAL
Qty: 90 TABLET | Refills: 1 | Status: SHIPPED | OUTPATIENT
Start: 2021-06-07 | End: 2021-12-06

## 2021-06-07 NOTE — TELEPHONE ENCOUNTER
Requested Prescriptions     Pending Prescriptions Disp Refills    amLODIPine (NORVASC) 10 MG tablet [Pharmacy Med Name: AMLODIPINE BESYLATE 10 MG TAB] 90 tablet 1     Sig: TAKE 1 TABLET BY MOUTH EVERY DAY       LOV 10/14/2020  No f/u  Labs 10/27/20

## 2021-06-08 DIAGNOSIS — I10 ESSENTIAL HYPERTENSION: ICD-10-CM

## 2021-06-08 RX ORDER — LISINOPRIL 40 MG/1
TABLET ORAL
Qty: 90 TABLET | Refills: 0 | Status: SHIPPED | OUTPATIENT
Start: 2021-06-08 | End: 2021-09-01

## 2021-06-11 DIAGNOSIS — M51.36 DDD (DEGENERATIVE DISC DISEASE), LUMBAR: ICD-10-CM

## 2021-06-11 RX ORDER — DICLOFENAC SODIUM 75 MG/1
TABLET, DELAYED RELEASE ORAL
Qty: 180 TABLET | Refills: 0 | Status: SHIPPED | OUTPATIENT
Start: 2021-06-11 | End: 2021-06-29 | Stop reason: SDUPTHER

## 2021-06-11 NOTE — TELEPHONE ENCOUNTER
Requested Prescriptions     Pending Prescriptions Disp Refills    diclofenac (VOLTAREN) 75 MG EC tablet [Pharmacy Med Name: DICLOFENAC SOD EC 75 MG TAB] 180 tablet 0     Sig: TAKE 1 TAB BY MOUTH 2 TIMES DAILY AS NEEDED FOR BACK PAIN       LOV 10/14/2020  No f/u  Labs 10/27/20

## 2021-06-20 DIAGNOSIS — E78.5 HYPERLIPIDEMIA, UNSPECIFIED HYPERLIPIDEMIA TYPE: ICD-10-CM

## 2021-06-21 RX ORDER — SIMVASTATIN 20 MG
TABLET ORAL
Qty: 90 TABLET | Refills: 0 | Status: SHIPPED | OUTPATIENT
Start: 2021-06-21 | End: 2021-09-16

## 2021-06-21 NOTE — TELEPHONE ENCOUNTER
Requested Prescriptions     Pending Prescriptions Disp Refills    simvastatin (ZOCOR) 20 MG tablet [Pharmacy Med Name: SIMVASTATIN 20 MG TABLET] 90 tablet 0     Sig: TAKE 1 TABLET BY MOUTH EVERY DAY AT NIGHT     Last ov 10/14/20  Last labs 10/27/20  No f/u

## 2021-06-29 DIAGNOSIS — M51.36 DDD (DEGENERATIVE DISC DISEASE), LUMBAR: ICD-10-CM

## 2021-06-29 RX ORDER — DICLOFENAC SODIUM 75 MG/1
TABLET, DELAYED RELEASE ORAL
Qty: 180 TABLET | Refills: 0 | Status: SHIPPED | OUTPATIENT
Start: 2021-06-29 | End: 2022-03-23

## 2021-06-29 NOTE — TELEPHONE ENCOUNTER
PCP is out of town. EMR showing successfully e-scribed June 11. I have resent it on her behalf. To make sure it was received this time, please call pharmacy to verify script was received today. If not, then call it in. Once confirmed, let Samson know.

## 2021-06-29 NOTE — TELEPHONE ENCOUNTER
Patient states that pharmacy never received this prescription. Please call pharmacy. diclofenac (VOLTAREN) 75 MG EC tablet [[id number]]     Order Details  Dose, Route, Frequency: As Directed  Dispense Quantity: 180 tablet Refills: 0        Sig: TAKE 1 TAB BY MOUTH 2 TIMES DAILY AS NEEDED FOR BACK PAIN       Start Date: 06/11/21 End Date: --  Written Date: 06/11/21 Expiration Date: 06/11/22     Diagnosis Association: DDD (degenerative disc disease), lumbar (M51.36)  Original Order:  diclofenac (VOLTAREN) 75 MG EC tablet [[id number]]  Providers    Authorizing Provider: Hairka Diaz MD NPI: [id number]  Ordering User:   aHrika Diaz MD      Pharmacy    [facility]/pharmacy #[id number] - [place] Alas, [address]   [address] [facility] [address]   Phone:  [phone]  Fax:  [phone]

## 2021-08-27 ENCOUNTER — OFFICE VISIT (OUTPATIENT)
Dept: FAMILY MEDICINE CLINIC | Age: 76
End: 2021-08-27
Payer: MEDICARE

## 2021-08-27 VITALS
DIASTOLIC BLOOD PRESSURE: 70 MMHG | SYSTOLIC BLOOD PRESSURE: 140 MMHG | TEMPERATURE: 96.8 F | OXYGEN SATURATION: 98 % | BODY MASS INDEX: 30.65 KG/M2 | WEIGHT: 218.2 LBS | HEART RATE: 75 BPM | RESPIRATION RATE: 14 BRPM

## 2021-08-27 DIAGNOSIS — E78.5 HYPERLIPIDEMIA, UNSPECIFIED HYPERLIPIDEMIA TYPE: ICD-10-CM

## 2021-08-27 DIAGNOSIS — R35.0 URINARY FREQUENCY: Primary | ICD-10-CM

## 2021-08-27 DIAGNOSIS — I10 ESSENTIAL HYPERTENSION: ICD-10-CM

## 2021-08-27 DIAGNOSIS — R39.15 URINARY URGENCY: ICD-10-CM

## 2021-08-27 LAB
BILIRUBIN, POC: NORMAL
BLOOD URINE, POC: NEGATIVE
CLARITY, POC: NORMAL
COLOR, POC: NORMAL
GLUCOSE URINE, POC: NEGATIVE
KETONES, POC: NEGATIVE
LEUKOCYTE EST, POC: NEGATIVE
NITRITE, POC: NEGATIVE
PH, POC: 6
PROTEIN, POC: NORMAL
SPECIFIC GRAVITY, POC: 1.03
UROBILINOGEN, POC: 0.2

## 2021-08-27 PROCEDURE — 99214 OFFICE O/P EST MOD 30 MIN: CPT | Performed by: FAMILY MEDICINE

## 2021-08-27 PROCEDURE — 3017F COLORECTAL CA SCREEN DOC REV: CPT | Performed by: FAMILY MEDICINE

## 2021-08-27 PROCEDURE — 1036F TOBACCO NON-USER: CPT | Performed by: FAMILY MEDICINE

## 2021-08-27 PROCEDURE — 81002 URINALYSIS NONAUTO W/O SCOPE: CPT | Performed by: FAMILY MEDICINE

## 2021-08-27 PROCEDURE — G8417 CALC BMI ABV UP PARAM F/U: HCPCS | Performed by: FAMILY MEDICINE

## 2021-08-27 PROCEDURE — G8427 DOCREV CUR MEDS BY ELIG CLIN: HCPCS | Performed by: FAMILY MEDICINE

## 2021-08-27 PROCEDURE — 4040F PNEUMOC VAC/ADMIN/RCVD: CPT | Performed by: FAMILY MEDICINE

## 2021-08-27 PROCEDURE — 1123F ACP DISCUSS/DSCN MKR DOCD: CPT | Performed by: FAMILY MEDICINE

## 2021-08-27 RX ORDER — FESOTERODINE FUMARATE 4 MG/1
4 TABLET, EXTENDED RELEASE ORAL DAILY
Qty: 30 TABLET | Refills: 3 | Status: SHIPPED | OUTPATIENT
Start: 2021-08-27 | End: 2022-01-04

## 2021-08-27 SDOH — ECONOMIC STABILITY: FOOD INSECURITY: WITHIN THE PAST 12 MONTHS, THE FOOD YOU BOUGHT JUST DIDN'T LAST AND YOU DIDN'T HAVE MONEY TO GET MORE.: NEVER TRUE

## 2021-08-27 SDOH — ECONOMIC STABILITY: FOOD INSECURITY: WITHIN THE PAST 12 MONTHS, YOU WORRIED THAT YOUR FOOD WOULD RUN OUT BEFORE YOU GOT MONEY TO BUY MORE.: NEVER TRUE

## 2021-08-27 ASSESSMENT — SOCIAL DETERMINANTS OF HEALTH (SDOH): HOW HARD IS IT FOR YOU TO PAY FOR THE VERY BASICS LIKE FOOD, HOUSING, MEDICAL CARE, AND HEATING?: NOT HARD AT ALL

## 2021-08-27 ASSESSMENT — PATIENT HEALTH QUESTIONNAIRE - PHQ9
SUM OF ALL RESPONSES TO PHQ QUESTIONS 1-9: 0
SUM OF ALL RESPONSES TO PHQ QUESTIONS 1-9: 0
2. FEELING DOWN, DEPRESSED OR HOPELESS: 0
1. LITTLE INTEREST OR PLEASURE IN DOING THINGS: 0
SUM OF ALL RESPONSES TO PHQ QUESTIONS 1-9: 0
SUM OF ALL RESPONSES TO PHQ9 QUESTIONS 1 & 2: 0

## 2021-08-27 NOTE — PROGRESS NOTES
Dispense: 30 tablet; Refill: 3  - Comprehensive Metabolic Panel; Future    3. Hyperlipidemia, unspecified hyperlipidemia type  - Controlled and continue Simvastatin 20 mg qd and low cholesterol diet. - Comprehensive Metabolic Panel; Future  - Lipid Panel; Future    4. Essential hypertension  - Controlled. Controlled and continue Lisinopril 40 mg qd and low sodium diet. - Comprehensive Metabolic Panel; Future     Follow up in 1-2 months/ prn.

## 2021-09-01 DIAGNOSIS — I10 ESSENTIAL HYPERTENSION: ICD-10-CM

## 2021-09-01 RX ORDER — LISINOPRIL 40 MG/1
TABLET ORAL
Qty: 90 TABLET | Refills: 0 | Status: SHIPPED | OUTPATIENT
Start: 2021-09-01 | End: 2021-11-24

## 2021-09-13 LAB
ALBUMIN SERPL-MCNC: 4.4 G/DL (ref 3.5–5.7)
ALP BLD-CCNC: 60 IU/L (ref 35–135)
ALT SERPL-CCNC: 21 IU/L (ref 10–60)
ANION GAP SERPL CALCULATED.3IONS-SCNC: 5 MMOL/L (ref 6–18)
AST SERPL-CCNC: 20 IU/L (ref 10–40)
BILIRUB SERPL-MCNC: 0.6 MG/DL (ref 0–1.2)
BUN BLDV-MCNC: 16 MG/DL (ref 8–26)
CALCIUM SERPL-MCNC: 8.9 MG/DL (ref 8.5–10.4)
CHLORIDE BLD-SCNC: 106 MEQ/L (ref 98–111)
CHOLESTEROL, TOTAL: 134 MG/DL
CO2: 28 MMOL/L (ref 21–31)
CREAT SERPL-MCNC: 1.05 MG/DL (ref 0.7–1.3)
GFR AFRICAN AMERICAN: 78 ML/MIN/1.73 M2
GFR NON-AFRICAN AMERICAN: 67 ML/MIN/1.73 M2
GLUCOSE BLD-MCNC: 97 MG/DL (ref 70–99)
HDLC SERPL-MCNC: 35 MG/DL
LDL CHOLESTEROL CALCULATED: 83 MG/DL
NONHDLC SERPL-MCNC: 99 MG/DL
POTASSIUM SERPL-SCNC: 4.6 MEQ/L (ref 3.6–5.1)
SODIUM BLD-SCNC: 139 MEQ/L (ref 135–145)
TOTAL PROTEIN: 6.3 G/DL (ref 6–8)
TRIGL SERPL-MCNC: 81 MG/DL

## 2021-09-16 DIAGNOSIS — E78.5 HYPERLIPIDEMIA, UNSPECIFIED HYPERLIPIDEMIA TYPE: ICD-10-CM

## 2021-09-16 RX ORDER — SIMVASTATIN 20 MG
TABLET ORAL
Qty: 90 TABLET | Refills: 0 | Status: SHIPPED | OUTPATIENT
Start: 2021-09-16 | End: 2021-12-17

## 2021-11-24 DIAGNOSIS — I10 ESSENTIAL HYPERTENSION: ICD-10-CM

## 2021-11-24 RX ORDER — LISINOPRIL 40 MG/1
TABLET ORAL
Qty: 90 TABLET | Refills: 0 | Status: SHIPPED | OUTPATIENT
Start: 2021-11-24 | End: 2022-03-07

## 2021-12-05 DIAGNOSIS — I10 ESSENTIAL HYPERTENSION: ICD-10-CM

## 2021-12-06 RX ORDER — AMLODIPINE BESYLATE 10 MG/1
TABLET ORAL
Qty: 90 TABLET | Refills: 1 | Status: SHIPPED | OUTPATIENT
Start: 2021-12-06 | End: 2022-03-16 | Stop reason: SDUPTHER

## 2021-12-06 RX ORDER — BUPROPION HYDROCHLORIDE 300 MG/1
TABLET ORAL
Qty: 90 TABLET | Refills: 1 | Status: SHIPPED | OUTPATIENT
Start: 2021-12-06 | End: 2022-03-16 | Stop reason: SDUPTHER

## 2021-12-06 NOTE — TELEPHONE ENCOUNTER
Requested Prescriptions     Pending Prescriptions Disp Refills    buPROPion (WELLBUTRIN XL) 300 MG extended release tablet [Pharmacy Med Name: BUPROPION HCL  MG TABLET] 90 tablet 1     Sig: TAKE 1 TABLET BY MOUTH EVERY DAY IN THE MORNING    amLODIPine (NORVASC) 10 MG tablet [Pharmacy Med Name: AMLODIPINE BESYLATE 10 MG TAB] 90 tablet 1     Sig: TAKE 1 TABLET BY MOUTH EVERY DAY     8/27/2021  Last ov 9/13/2021

## 2021-12-17 DIAGNOSIS — E78.5 HYPERLIPIDEMIA, UNSPECIFIED HYPERLIPIDEMIA TYPE: ICD-10-CM

## 2021-12-17 RX ORDER — SIMVASTATIN 20 MG
TABLET ORAL
Qty: 90 TABLET | Refills: 1 | Status: SHIPPED | OUTPATIENT
Start: 2021-12-17 | End: 2022-03-16 | Stop reason: SDUPTHER

## 2022-01-04 ENCOUNTER — OFFICE VISIT (OUTPATIENT)
Dept: FAMILY MEDICINE CLINIC | Age: 77
End: 2022-01-04
Payer: MEDICARE

## 2022-01-04 VITALS
HEART RATE: 69 BPM | OXYGEN SATURATION: 97 % | TEMPERATURE: 97.2 F | BODY MASS INDEX: 32.3 KG/M2 | HEIGHT: 70 IN | SYSTOLIC BLOOD PRESSURE: 142 MMHG | RESPIRATION RATE: 14 BRPM | DIASTOLIC BLOOD PRESSURE: 72 MMHG | WEIGHT: 225.6 LBS

## 2022-01-04 DIAGNOSIS — R35.0 FREQUENT URINATION: ICD-10-CM

## 2022-01-04 DIAGNOSIS — N30.01 ACUTE CYSTITIS WITH HEMATURIA: Primary | ICD-10-CM

## 2022-01-04 DIAGNOSIS — R35.0 BENIGN PROSTATIC HYPERPLASIA WITH URINARY FREQUENCY: ICD-10-CM

## 2022-01-04 DIAGNOSIS — N40.1 BENIGN PROSTATIC HYPERPLASIA WITH URINARY FREQUENCY: ICD-10-CM

## 2022-01-04 PROCEDURE — G8417 CALC BMI ABV UP PARAM F/U: HCPCS | Performed by: FAMILY MEDICINE

## 2022-01-04 PROCEDURE — 1036F TOBACCO NON-USER: CPT | Performed by: FAMILY MEDICINE

## 2022-01-04 PROCEDURE — G8427 DOCREV CUR MEDS BY ELIG CLIN: HCPCS | Performed by: FAMILY MEDICINE

## 2022-01-04 PROCEDURE — 3017F COLORECTAL CA SCREEN DOC REV: CPT | Performed by: FAMILY MEDICINE

## 2022-01-04 PROCEDURE — 99214 OFFICE O/P EST MOD 30 MIN: CPT | Performed by: FAMILY MEDICINE

## 2022-01-04 PROCEDURE — G8484 FLU IMMUNIZE NO ADMIN: HCPCS | Performed by: FAMILY MEDICINE

## 2022-01-04 PROCEDURE — 4040F PNEUMOC VAC/ADMIN/RCVD: CPT | Performed by: FAMILY MEDICINE

## 2022-01-04 PROCEDURE — 1123F ACP DISCUSS/DSCN MKR DOCD: CPT | Performed by: FAMILY MEDICINE

## 2022-01-04 RX ORDER — SULFAMETHOXAZOLE AND TRIMETHOPRIM 800; 160 MG/1; MG/1
1 TABLET ORAL 2 TIMES DAILY
Qty: 14 TABLET | Refills: 0 | Status: SHIPPED | OUTPATIENT
Start: 2022-01-04 | End: 2022-01-11

## 2022-01-04 NOTE — PROGRESS NOTES
Situational anxiety    Anxiety    Basal cell carcinoma of left cheek       Past Medical History:   Diagnosis Date    Asthma, mild intermittent, well-controlled     Cancer (HCC)     skin- face and shoulder    Colon polyps     CTS (carpal tunnel syndrome)     DDD (degenerative disc disease), lumbar     Hyperlipidemia     Hypertension     Medial epicondylitis     Migraine     Situational anxiety 2014    Trigger thumb of left hand        Past Surgical History:   Procedure Laterality Date    CARPAL TUNNEL RELEASE      TONSILLECTOMY          Family History   Problem Relation Age of Onset    Hypertension Mother     Heart Attack Mother 70    Hypertension Father     Cancer Father         lung    Hypertension Brother     Other Brother         cardiomyopathy       Social History     Tobacco Use    Smoking status: Former Smoker     Packs/day: 1.00     Years: 20.00     Pack years: 20.00     Quit date: 1994     Years since quittin.6    Smokeless tobacco: Never Used   Substance Use Topics    Alcohol use: Not on file    Drug use: Not on file            Review of Systems  Review of Systems    Objective:   Physical Exam  Vitals:    22 1627   BP: (!) 142/72   Pulse: 69   Resp: 14   Temp: 97.2 °F (36.2 °C)   TempSrc: Temporal   SpO2: 97%   Weight: 225 lb 9.6 oz (102.3 kg)   Height: 5' 10\" (1.778 m)       Physical Exam    NAD    Skin is warm and dry. No rash. Well hydrated  Alert and oriented x 3. Mood and affect are normal.   Chest: clear with no wheezes or rales. No retractions, or use of accessory muscles noted. Cardiovascular: PMI is not displaced, and no thrill noted. Regular rate and rhythm with no rub, murmur or gallop. No peripheral edema. The abdomen is soft without tenderness, guarding, mass, rebound or organomegaly. Aorta, femoral, DP and PT pulses intact. LENORA: normal tone.   3+ prostate enlargement, no tenderness or masses    Lab Results   Component Value Date COLORU RANI 08/27/2021    GLUCOSEU NEGATIVE 08/27/2021    KETUA NEGATIVE 08/27/2021    BILIRUBINUR SMALL+ 08/27/2021         Assessment:       Diagnosis Orders   1. Acute cystitis with hematuria  DINO Antoine MD, Urology, [facility]    sulfamethoxazole-trimethoprim (BACTRIM DS;SEPTRA DS) 800-160 MG per tablet  Push fluids. 2. Benign prostatic hyperplasia with urinary frequency  DINO Antoine MD, Urology, [facility]   3. Frequent urination  Culture, Urine          Plan:      Side effects of current medications reviewed and questions answered. Call or return to clinic prn if these symptoms worsen or fail to improve as anticipated.

## 2022-01-06 LAB — URINE CULTURE, ROUTINE: NORMAL

## 2022-03-07 DIAGNOSIS — I10 ESSENTIAL HYPERTENSION: ICD-10-CM

## 2022-03-07 RX ORDER — LISINOPRIL 40 MG/1
TABLET ORAL
Qty: 90 TABLET | Refills: 0 | Status: SHIPPED | OUTPATIENT
Start: 2022-03-07 | End: 2022-05-31

## 2022-03-07 NOTE — TELEPHONE ENCOUNTER
Requested Prescriptions     Pending Prescriptions Disp Refills    lisinopril (PRINIVIL;ZESTRIL) 40 MG tablet [Pharmacy Med Name: LISINOPRIL 40 MG TABLET] 90 tablet 0     Sig: TAKE 1 TABLET BY MOUTH EVERY DAY. FOLLOW UP LABS ARE NEEDED.        LOV 8/27/2021  No f/u  Labs 9/13/21

## 2022-03-16 ENCOUNTER — OFFICE VISIT (OUTPATIENT)
Dept: FAMILY MEDICINE CLINIC | Age: 77
End: 2022-03-16
Payer: MEDICARE

## 2022-03-16 VITALS
RESPIRATION RATE: 12 BRPM | DIASTOLIC BLOOD PRESSURE: 64 MMHG | BODY MASS INDEX: 32.24 KG/M2 | SYSTOLIC BLOOD PRESSURE: 110 MMHG | WEIGHT: 225.2 LBS | OXYGEN SATURATION: 99 % | TEMPERATURE: 97.3 F | HEART RATE: 80 BPM | HEIGHT: 70 IN

## 2022-03-16 DIAGNOSIS — Z00.00 MEDICARE ANNUAL WELLNESS VISIT, SUBSEQUENT: ICD-10-CM

## 2022-03-16 DIAGNOSIS — Z87.891 FORMER SMOKER: ICD-10-CM

## 2022-03-16 DIAGNOSIS — I10 ESSENTIAL HYPERTENSION: ICD-10-CM

## 2022-03-16 DIAGNOSIS — M25.461 SWELLING OF KNEE JOINT, RIGHT: ICD-10-CM

## 2022-03-16 DIAGNOSIS — Z23 NEED FOR SHINGLES VACCINE: ICD-10-CM

## 2022-03-16 DIAGNOSIS — Z00.00 ROUTINE GENERAL MEDICAL EXAMINATION AT A HEALTH CARE FACILITY: Primary | ICD-10-CM

## 2022-03-16 DIAGNOSIS — F41.8 SITUATIONAL ANXIETY: ICD-10-CM

## 2022-03-16 DIAGNOSIS — E78.5 HYPERLIPIDEMIA, UNSPECIFIED HYPERLIPIDEMIA TYPE: ICD-10-CM

## 2022-03-16 DIAGNOSIS — Z12.11 COLON CANCER SCREENING: ICD-10-CM

## 2022-03-16 PROCEDURE — G8484 FLU IMMUNIZE NO ADMIN: HCPCS | Performed by: FAMILY MEDICINE

## 2022-03-16 PROCEDURE — 1123F ACP DISCUSS/DSCN MKR DOCD: CPT | Performed by: FAMILY MEDICINE

## 2022-03-16 PROCEDURE — G0439 PPPS, SUBSEQ VISIT: HCPCS | Performed by: FAMILY MEDICINE

## 2022-03-16 PROCEDURE — 3017F COLORECTAL CA SCREEN DOC REV: CPT | Performed by: FAMILY MEDICINE

## 2022-03-16 PROCEDURE — 4040F PNEUMOC VAC/ADMIN/RCVD: CPT | Performed by: FAMILY MEDICINE

## 2022-03-16 RX ORDER — SIMVASTATIN 20 MG
TABLET ORAL
Qty: 90 TABLET | Refills: 1 | Status: SHIPPED | OUTPATIENT
Start: 2022-03-16 | End: 2022-10-11

## 2022-03-16 RX ORDER — AMLODIPINE BESYLATE 10 MG/1
TABLET ORAL
Qty: 90 TABLET | Refills: 1 | Status: SHIPPED | OUTPATIENT
Start: 2022-03-16

## 2022-03-16 RX ORDER — BUPROPION HYDROCHLORIDE 300 MG/1
TABLET ORAL
Qty: 90 TABLET | Refills: 1 | Status: SHIPPED | OUTPATIENT
Start: 2022-03-16 | End: 2022-05-23

## 2022-03-16 ASSESSMENT — PATIENT HEALTH QUESTIONNAIRE - PHQ9
2. FEELING DOWN, DEPRESSED OR HOPELESS: 0
SUM OF ALL RESPONSES TO PHQ QUESTIONS 1-9: 0
SUM OF ALL RESPONSES TO PHQ QUESTIONS 1-9: 0
1. LITTLE INTEREST OR PLEASURE IN DOING THINGS: 0
SUM OF ALL RESPONSES TO PHQ QUESTIONS 1-9: 0
SUM OF ALL RESPONSES TO PHQ QUESTIONS 1-9: 0
SUM OF ALL RESPONSES TO PHQ9 QUESTIONS 1 & 2: 0

## 2022-03-16 ASSESSMENT — LIFESTYLE VARIABLES: HOW OFTEN DO YOU HAVE A DRINK CONTAINING ALCOHOL: NEVER

## 2022-03-16 NOTE — PATIENT INSTRUCTIONS
Personalized Preventive Plan for Alexa Arriola - 3/16/2022  Medicare offers a range of preventive health benefits. Some of the tests and screenings are paid in full while other may be subject to a deductible, co-insurance, and/or copay. Some of these benefits include a comprehensive review of your medical history including lifestyle, illnesses that may run in your family, and various assessments and screenings as appropriate. After reviewing your medical record and screening and assessments performed today your provider may have ordered immunizations, labs, imaging, and/or referrals for you. A list of these orders (if applicable) as well as your Preventive Care list are included within your After Visit Summary for your review. Other Preventive Recommendations:    · A preventive eye exam performed by an eye specialist is recommended every 1-2 years to screen for glaucoma; cataracts, macular degeneration, and other eye disorders. · A preventive dental visit is recommended every 6 months. · Try to get at least 150 minutes of exercise per week or 10,000 steps per day on a pedometer . · Order or download the FREE \"Exercise & Physical Activity: Your Everyday Guide\" from [employer]. Call 3-696.798.3867 or search [employer] online. · You need 2030-2251 mg of calcium and 8550-9646 IU of vitamin D per day. It is possible to meet your calcium requirement with diet alone, but a vitamin D supplement is usually necessary to meet this goal.  · When exposed to the sun, use a sunscreen that protects against both UVA and UVB radiation with an SPF of 30 or greater. Reapply every 2 to 3 hours or after sweating, drying off with a towel, or swimming. · Always wear a seat belt when traveling in a car. Always wear a helmet when riding a bicycle or motorcycle.

## 2022-03-16 NOTE — PROGRESS NOTES
Medicare Annual Wellness Visit    Fannie Rocha is here for Medicare AWV    Assessment & Plan       1. Routine general medical examination at a health care facility  - Comprehensive Metabolic Panel; Future  - Lipid Panel; Future  - CBC; Future    2. Essential hypertension  - Controlled. Continue Amlodipine 10 mg qd, Lisinopril 40 mg qd and low sodium diet. - amLODIPine (NORVASC) 10 MG tablet; TAKE 1 TABLET BY MOUTH EVERY DAY  Dispense: 90 tablet; Refill: 1  - US SCREENING FOR AAA; Future  - Comprehensive Metabolic Panel; Future  - CBC; Future    3. Hyperlipidemia, unspecified hyperlipidemia type  - Controlled. Continue Simvastatin 20 mg qd and low cholesterol diet. - simvastatin (ZOCOR) 20 MG tablet; TAKE 1 TABLET BY MOUTH EVERY DAY AT NIGHT  Dispense: 90 tablet; Refill: 1  - Comprehensive Metabolic Panel; Future  - Lipid Panel; Future    4. Former smoker  - [place] AAA; Future and follow up after completed/ prn.     5. Situational anxiety  - Stable. Continue Wellbutrin  mg qd. - buPROPion (WELLBUTRIN XL) 300 MG extended release tablet; TAKE 1 TABLET BY MOUTH EVERY DAY IN THE MORNING  Dispense: 90 tablet; Refill: 1    6. Swelling of knee joint, right  - negative exam, except very mild medial, inferior edema to patella. - Monitor given no pain and mild edema. 7. Colon cancer screening  - Referral - Hayden Carrasco MD, Gastroenterology, [place]  - [facility]; Future    8. Need for shingles vaccine  - Recommended Shingrix vaccine - series of 2 at pharmacy. Recommendations for Preventive Services Due: see orders and patient instructions/AVS.  Recommended screening schedule for the next 5-10 years is provided to the patient in written form: see Patient Instructions/AVS.    Patient was found to have prostate enlargement . He saw Urologist and was started on Flomax nightly. He is doing better now. He gets up once per night, but was getting up 3-4 times per night. Subjective   The following acute and/or chronic problems were also addressed today:    Patient was found to have prostate enlargement . He saw Urologist and was started on Flomax nightly. He is doing better now. He gets up once per night, but was getting up 3-4 times per night. Coffee is 1-2 per day . No alcohol. He fell once with avoiding puppy while hurdling a gait and one time he slipped with mud. No dizziness with Flomax. He had some right knee pain medially for 24 hours or so. Swelling is still there medially , but no pain . Patient's complete Health Risk Assessment and screening values have been reviewed and are found in Flowsheets. The following problems were reviewed today and where indicated follow up appointments were made and/or referrals ordered. Positive Risk Factor Screenings with Interventions:    Fall Risk:  Do you feel unsteady or are you worried about falling? : no  2 or more falls in past year?: (!) yes  Fall with injury in past year?: (!) yes     Fall Risk Interventions:    · Home exercises provided to promote strength and balance            General Health and ACP:  General  In general, how would you say your health is?: Excellent  In the past 7 days, have you experienced any of the following: New or Increased Pain, New or Increased Fatigue, Loneliness, Social Isolation, Stress or Anger?: No  Do you get the social and emotional support that you need?: Yes  Do you have a Living Will?: Yes    Advance Directives     Power of  Living Will ACP-Advance Directive ACP-Power of     Not on File Not on File Not on File Not on File      General Health Risk Interventions:  · Has Living Will, and will give a copy to office.     Health Habits/Nutrition:     Physical Activity: Sufficiently Active    Days of Exercise per Week: 4 days    Minutes of Exercise per Session: 60 min     Have you lost any weight without trying in the past 3 months?: No  Body mass index: (!) 31.88  Have you seen the dentist within the past year?: Appointment is scheduled    Health Habits/Nutrition Interventions:  · Dental exam overdue:  patient encouraged to make appointment with his/her dentist    Hearing/Vision:  Do you or your family notice any trouble with your hearing that hasn't been managed with hearing aids?: No  Do you have difficulty driving, watching TV, or doing any of your daily activities because of your eyesight?: No  Have you had an eye exam within the past year?: (!) No  No exam data present    Hearing/Vision Interventions:  · Vision concerns:  patient encouraged to make appointment with his/her eye specialist            Objective   Vitals:    03/16/22 1528   BP: 110/64   Pulse: 80   Resp: 12   Temp: 97.3 °F (36.3 °C)   SpO2: 99%   Weight: 225 lb 3.2 oz (102.2 kg)   Height: 5' 10.47\" (1.79 m)      Body mass index is 31.88 kg/m². General Appearance: alert and oriented to person, place and time, well developed and well- nourished, in no acute distress  Skin: warm and dry, no rash or erythema  Head: normocephalic and atraumatic  Eyes: pupils equal, round, and reactive to light, extraocular eye movements intact, conjunctivae normal  ENT: tympanic membrane, external ear and ear canal normal bilaterally, nose without deformity, nasal mucosa and turbinates normal without polyps  Neck: supple and non-tender without mass, no thyromegaly or thyroid nodules, no cervical lymphadenopathy  Pulmonary/Chest: clear to auscultation bilaterally- no wheezes, rales or rhonchi, normal air movement, no respiratory distress  Cardiovascular: normal rate, regular rhythm, normal S1 and S2, no murmurs, rubs, clicks, or gallops, distal pulses intact, no carotid bruits  Abdomen: soft, non-tender, non-distended, normal bowel sounds, no masses or organomegaly  Extremities: no cyanosis, clubbing or edema  Musculoskeletal: normal range of motion, no joint swelling, deformity or tenderness. Knees: no erythema. Slight edema to right knee - inferior , medial patella. FROM . Negative anterior/ posterior drawer. Negative Meagan's / Lachman's bilaterally. Strength 5/5. DTRs 2+ bilateral.   Neurologic: reflexes normal and symmetric, no cranial nerve deficit, gait, coordination and speech normal       Allergies   Allergen Reactions    Penicillins      Mother had allergies     Prior to Visit Medications    Medication Sig Taking? Authorizing Provider   lisinopril (PRINIVIL;ZESTRIL) 40 MG tablet TAKE 1 TABLET BY MOUTH EVERY DAY. FOLLOW UP LABS ARE NEEDED. Yes Blanca Chatterjee MD   simvastatin (ZOCOR) 20 MG tablet TAKE 1 TABLET BY MOUTH EVERY DAY AT NIGHT Yes Blanca Chatterjee MD   buPROPion (WELLBUTRIN XL) 300 MG extended release tablet TAKE 1 TABLET BY MOUTH EVERY DAY IN THE MORNING Yes Blanca Chatterjee MD   amLODIPine (NORVASC) 10 MG tablet TAKE 1 TABLET BY MOUTH EVERY DAY Yes Blanca Chatterjee MD   diclofenac (VOLTAREN) 75 MG EC tablet TAKE 1 TAB BY MOUTH 2 TIMES DAILY AS NEEDED FOR BACK PAIN. Take with food. Yes Eyvonne Councilman Bingcang, DO   albuterol sulfate  (90 Base) MCG/ACT inhaler INHALE 2 PUFFS INTO LUNGS EVERY 6 HOURS AS NEEDED FOR WHEEZING Yes Blanca Chatterjee MD   therapeutic multivitamin-minerals [facility]) tablet Take 1 tablet by mouth daily. Yes Historical Provider, MD   fluticasone (FLOVENT HFA) 110 MCG/ACT inhaler Inhale 2 puffs into the lungs 2 times daily  DO Wm Massey (Including outside providers/suppliers regularly involved in providing care):   Patient Care Team:  Blanca Chatterjee MD as PCP - General (Family Medicine)  Blanca Chatterjee MD as PCP - [facility] Empaneled Provider    Reviewed and updated this visit:  Tobacco  Allergies  Meds  Problems  Med Hx  Surg Hx  Soc Hx  Fam Hx        Follow up 6 months/ prn.

## 2022-03-23 DIAGNOSIS — M51.36 DDD (DEGENERATIVE DISC DISEASE), LUMBAR: ICD-10-CM

## 2022-03-23 RX ORDER — DICLOFENAC SODIUM 75 MG/1
TABLET, DELAYED RELEASE ORAL
Qty: 180 TABLET | Refills: 0 | Status: SHIPPED | OUTPATIENT
Start: 2022-03-23 | End: 2022-06-06 | Stop reason: SDUPTHER

## 2022-04-01 LAB
ALBUMIN SERPL-MCNC: 4.4 G/DL (ref 3.5–5.7)
ALP BLD-CCNC: 70 IU/L (ref 35–135)
ALT SERPL-CCNC: 24 IU/L (ref 10–60)
ANION GAP SERPL CALCULATED.3IONS-SCNC: 5 MMOL/L (ref 6–18)
AST SERPL-CCNC: 19 IU/L (ref 10–40)
BILIRUB SERPL-MCNC: 0.5 MG/DL (ref 0–1.2)
BUN BLDV-MCNC: 15 MG/DL (ref 8–26)
CALCIUM SERPL-MCNC: 9.5 MG/DL (ref 8.5–10.4)
CHLORIDE BLD-SCNC: 107 MEQ/L (ref 98–111)
CHOLESTEROL, TOTAL: 135 MG/DL
CO2: 27 MMOL/L (ref 21–31)
CREAT SERPL-MCNC: 1.13 MG/DL (ref 0.7–1.3)
EGFR (CKD-EPI): 68 ML/MIN/1.73 M2
GLUCOSE BLD-MCNC: 106 MG/DL (ref 70–99)
HCT VFR BLD CALC: 44.5 % (ref 40–50)
HDLC SERPL-MCNC: 35 MG/DL
HEMOGLOBIN: 14.6 G/DL (ref 13.5–16.5)
LDL CHOLESTEROL CALCULATED: 89 MG/DL
MCH RBC QN AUTO: 30.8 PG (ref 27–33)
MCHC RBC AUTO-ENTMCNC: 32.8 G/DL (ref 32–36)
MCV RBC AUTO: 93.8 FL (ref 82–97)
NONHDLC SERPL-MCNC: 100 MG/DL
PDW BLD-RTO: 13.6 % (ref 12.3–17)
PLATELET # BLD: 208 THOU/MCL (ref 140–375)
PMV BLD AUTO: 8.3 FL (ref 7.4–11.5)
POTASSIUM SERPL-SCNC: 4.5 MEQ/L (ref 3.6–5.1)
RBC # BLD: 4.74 MIL/MCL (ref 4.4–5.8)
SODIUM BLD-SCNC: 139 MEQ/L (ref 135–145)
TOTAL PROTEIN: 6.3 G/DL (ref 6–8)
TRIGL SERPL-MCNC: 54 MG/DL
WBC # BLD: 5.7 THOU/MCL (ref 3.6–10.5)

## 2022-04-03 DIAGNOSIS — R73.01 ELEVATED FASTING GLUCOSE: Primary | ICD-10-CM

## 2022-05-11 ENCOUNTER — TELEPHONE (OUTPATIENT)
Dept: FAMILY MEDICINE CLINIC | Age: 77
End: 2022-05-11

## 2022-05-11 DIAGNOSIS — J45.20 ASTHMA, MILD INTERMITTENT, WELL-CONTROLLED: ICD-10-CM

## 2022-05-11 RX ORDER — ALBUTEROL SULFATE 90 UG/1
AEROSOL, METERED RESPIRATORY (INHALATION)
Qty: 1 EACH | Refills: 2 | Status: SHIPPED | OUTPATIENT
Start: 2022-05-11

## 2022-05-11 NOTE — TELEPHONE ENCOUNTER
Requested Prescriptions     Pending Prescriptions Disp Refills    albuterol sulfate  (90 Base) MCG/ACT inhaler       Sig: INHALE 2 PUFFS INTO LUNGS EVERY 6 HOURS AS NEEDED FOR WHEEZING     Last ov 3/16/22  Last lab 4/1/22  Next ov n/a

## 2022-05-11 NOTE — TELEPHONE ENCOUNTER
Medication name:albuterol sulfate  (90 Base) MCG/ACT inhaler       Medication dose:  Frequency:  INHALE 2 PUFFS INTO LUNGS EVERY 6 HOURS AS NEEDED FOR WHEEZING             Quantity:1 Inhaler  Pharmacy name:[facility]/pharmacy #[id number] - [place], [address]   [place], [place] [address]   Phone:  [phone]  Fax:  [phone]  Pharmacy number:  Last OV:3/16/22  Last Labs:

## 2022-05-21 DIAGNOSIS — F41.8 SITUATIONAL ANXIETY: ICD-10-CM

## 2022-05-23 RX ORDER — BUPROPION HYDROCHLORIDE 300 MG/1
TABLET ORAL
Qty: 90 TABLET | Refills: 1 | Status: SHIPPED | OUTPATIENT
Start: 2022-05-23

## 2022-05-31 DIAGNOSIS — I10 ESSENTIAL HYPERTENSION: ICD-10-CM

## 2022-05-31 RX ORDER — LISINOPRIL 40 MG/1
TABLET ORAL
Qty: 90 TABLET | Refills: 1 | Status: SHIPPED | OUTPATIENT
Start: 2022-05-31

## 2022-06-02 ENCOUNTER — TELEPHONE (OUTPATIENT)
Dept: FAMILY MEDICINE CLINIC | Age: 77
End: 2022-06-02

## 2022-06-02 DIAGNOSIS — L98.9 FACIAL SKIN LESION: Primary | ICD-10-CM

## 2022-06-02 NOTE — TELEPHONE ENCOUNTER
Refer to dermatologist - Dr. Josef Araiza if downstairs is not able to accommodate or to Dr. Anastasiya Arboleda, etc with [facility].

## 2022-06-02 NOTE — TELEPHONE ENCOUNTER
It looks as though the patient saw Dr. Terris Canavan last year and several lesions removed.   Okay to place another referral?

## 2022-06-02 NOTE — TELEPHONE ENCOUNTER
----- Message from Jenifer Guillaume sent at 6/2/2022  2:26 PM EDT -----  Subject: Referral Request    QUESTIONS   Reason for referral request? Dermatologist referral request   Has the physician seen you for this condition before? No   Preferred Specialist (if applicable)? Josh Roles  Do you already have an appointment scheduled? No  Additional Information for Provider? Spot on nose that needs to be   checked. ---------------------------------------------------------------------------  --------------  Angy [place] INFO  What is the best way for the office to contact you? OK to leave message on   voicemail  Preferred Call Back Phone Number? [phone]  ---------------------------------------------------------------------------  --------------  SCRIPT ANSWERS  Relationship to Patient?  Self

## 2022-06-06 ENCOUNTER — TELEPHONE (OUTPATIENT)
Dept: FAMILY MEDICINE CLINIC | Age: 77
End: 2022-06-06

## 2022-06-06 DIAGNOSIS — M51.36 DDD (DEGENERATIVE DISC DISEASE), LUMBAR: ICD-10-CM

## 2022-06-06 RX ORDER — DICLOFENAC SODIUM 75 MG/1
TABLET, DELAYED RELEASE ORAL
Qty: 180 TABLET | Refills: 0 | Status: SHIPPED | OUTPATIENT
Start: 2022-06-06

## 2022-06-06 NOTE — TELEPHONE ENCOUNTER
Requested Prescriptions     Pending Prescriptions Disp Refills    diclofenac (VOLTAREN) 75 MG EC tablet 180 tablet 0     Sig: TAKE 1 TAB BY MOUTH 2 TIMES DAILY AS NEEDED FOR BACK PAIN. TAKE WITH FOOD.      Last ov 3/16/2022   Last labs 4/1/22

## 2022-06-06 NOTE — TELEPHONE ENCOUNTER
Medication name:diclofenac (VOLTAREN) 75 MG EC tablet       Medication dose:  Frequency:TAKE 1 TAB BY MOUTH 2 TIMES DAILY AS NEEDED FOR BACK PAIN. TAKE WITH FOOD.   Quantity:180 tablets  Pharmacy name:[facility]/pharmacy #[id number] Sree Gallardo, [address] [place], [place] [address]   Phone:  [phone]  Fax:  [phone]  Pharmacy number:  Last OV:3/16/22  Last Labs:4/1/22

## 2022-06-09 ENCOUNTER — TELEPHONE (OUTPATIENT)
Dept: FAMILY MEDICINE CLINIC | Age: 77
End: 2022-06-09

## 2022-06-09 NOTE — TELEPHONE ENCOUNTER
Spoke to spouse to schedule appointment for pt and she said they are unable to do a virtual visit or on mychart. Symptoms started 3 days ago with sore throat being the worst symptom. No shortness of breath or chest pain.

## 2022-06-09 NOTE — TELEPHONE ENCOUNTER
Advise for him to do a VV appointment or call Urgent Care . He is vaccinated and boosted, so an anti-viral is not absolutely needed, but could be given by Urgent Care if desires. Push fluids - water. Get plenty of rest and eat well balanced. You should self quarantine for 5-7 days from onset of symptoms and be fever free for > 24 hours, which ever is longer. To ED if persistent shortness of breath or pulse ox < 90 % occurs.

## 2022-06-09 NOTE — TELEPHONE ENCOUNTER
Patient spouse called in stating spouse was recently seen at urgent care and confirmed he does have COVID. The patient has a diagnosis of asthma and spouse is concerned as he was not given antivirals at the urgent care. They offered tylenol and cough syrup OTC. Wanting to know if they can get some sort of medication for him to feel better.        Symptoms:  - cough  - positive  - horrible sore throat   - slight fever 100.2 yesterday       Thank you

## 2022-08-23 ENCOUNTER — OFFICE VISIT (OUTPATIENT)
Dept: FAMILY MEDICINE CLINIC | Age: 77
End: 2022-08-23
Payer: MEDICARE

## 2022-08-23 VITALS
SYSTOLIC BLOOD PRESSURE: 132 MMHG | HEART RATE: 82 BPM | DIASTOLIC BLOOD PRESSURE: 68 MMHG | BODY MASS INDEX: 35.06 KG/M2 | TEMPERATURE: 97.3 F | OXYGEN SATURATION: 98 % | HEIGHT: 67 IN | WEIGHT: 223.4 LBS

## 2022-08-23 DIAGNOSIS — H69.83 ETD (EUSTACHIAN TUBE DYSFUNCTION), BILATERAL: Primary | ICD-10-CM

## 2022-08-23 DIAGNOSIS — Z13.6 SCREENING FOR AAA (ABDOMINAL AORTIC ANEURYSM): ICD-10-CM

## 2022-08-23 DIAGNOSIS — U07.1 COVID-19 VIRUS INFECTION: ICD-10-CM

## 2022-08-23 DIAGNOSIS — I10 ESSENTIAL HYPERTENSION: ICD-10-CM

## 2022-08-23 DIAGNOSIS — Z13.6 SCREENING, ISCHEMIC HEART DISEASE: ICD-10-CM

## 2022-08-23 DIAGNOSIS — E78.5 HYPERLIPIDEMIA, UNSPECIFIED HYPERLIPIDEMIA TYPE: ICD-10-CM

## 2022-08-23 DIAGNOSIS — I10 PRIMARY HYPERTENSION: ICD-10-CM

## 2022-08-23 PROCEDURE — G8417 CALC BMI ABV UP PARAM F/U: HCPCS | Performed by: FAMILY MEDICINE

## 2022-08-23 PROCEDURE — 1123F ACP DISCUSS/DSCN MKR DOCD: CPT | Performed by: FAMILY MEDICINE

## 2022-08-23 PROCEDURE — G8427 DOCREV CUR MEDS BY ELIG CLIN: HCPCS | Performed by: FAMILY MEDICINE

## 2022-08-23 PROCEDURE — 99214 OFFICE O/P EST MOD 30 MIN: CPT | Performed by: FAMILY MEDICINE

## 2022-08-23 PROCEDURE — 1036F TOBACCO NON-USER: CPT | Performed by: FAMILY MEDICINE

## 2022-08-23 RX ORDER — PREDNISONE 20 MG/1
TABLET ORAL
Qty: 18 TABLET | Refills: 0 | Status: SHIPPED | OUTPATIENT
Start: 2022-08-23 | End: 2022-09-02

## 2022-08-23 NOTE — PROGRESS NOTES
Pt did have COVID about 2 months ago, and sx were mild overall but states that since then he has had some persistent R ear fullness, ringing and clogging feeling on L ear. Pt can hear his HR. Pt states that COVID sx were mild to moderate with malaise, fever, and achiness and fatigue. Pt does feel that overall he is much better     Pt here for follow up of blood pressure. Pt states doing great with adherence to therapy and feels well. No issues of chest pain, shortness of breath. No vision changes, headache, swelling in legs. Pt does take lisinopril and amlodipine regularly. Except as noted above in the history of present illness, the review of systems is  negative for headache, vision changes, chest pain, shortness of breath, abdominal pain, urinary sx, bowel changes. Past medical, surgical, and social history reviewed and updated  Medications and allergies reviewed and updated      O: /68   Pulse 82   Temp 97.3 °F (36.3 °C)   Ht 5' 7.13\" (1.705 m)   Wt 223 lb 6.4 oz (101.3 kg)   SpO2 98%   BMI 34.86 kg/m²   GEN: No acute distress, cooperative, well nourished, alert. HEENT: PEERLA, EOMI , normocephalic/atraumatic, nares and oropharynx clear. Mucous membranes normal, Tympanic membranes clear bilaterally. Mild nasal congestion bilaterally, bilateral fluid behing tympanic membrane, L ear with retraction of tympanic membrane. No erythema  Neck: soft, supple, no thyromegaly, mass, no Lymphadenopathy. No carotid bruit bilaterally  CV: Regular rate and rhythm, no murmur, rubs, gallops. No edema. Resp: Clear to auscultation bilaterally good air entry bilaterally  No crackles, wheeze. Breathing comfortably. Psych: mood stable, No suicidal thoughts or ideation           Current Outpatient Medications   Medication Sig Dispense Refill    predniSONE (DELTASONE) 20 MG tablet Take 3 tabs by mouth po qd x 3d, then 2tabs po qd x 3d, then 1 tab po qd x 3d.  18 tablet 0    diclofenac (VOLTAREN) 75 MG EC tablet TAKE 1 TAB BY MOUTH 2 TIMES DAILY AS NEEDED FOR BACK PAIN. TAKE WITH FOOD. 180 tablet 0    lisinopril (PRINIVIL;ZESTRIL) 40 MG tablet TAKE 1 TABLET BY MOUTH EVERY DAY. 90 tablet 1    buPROPion (WELLBUTRIN XL) 300 MG extended release tablet TAKE 1 TABLET BY MOUTH EVERY DAY IN THE MORNING 90 tablet 1    albuterol sulfate  (90 Base) MCG/ACT inhaler INHALE 2 PUFFS INTO LUNGS EVERY 6 HOURS AS NEEDED FOR WHEEZING 1 each 2    amLODIPine (NORVASC) 10 MG tablet TAKE 1 TABLET BY MOUTH EVERY DAY 90 tablet 1    simvastatin (ZOCOR) 20 MG tablet TAKE 1 TABLET BY MOUTH EVERY DAY AT NIGHT 90 tablet 1    therapeutic multivitamin-minerals (THERAGRAN-M) tablet Take 1 tablet by mouth daily. fluticasone (FLOVENT HFA) 110 MCG/ACT inhaler Inhale 2 puffs into the lungs 2 times daily 1 Inhaler 2     No current facility-administered medications for this visit. ASSESSMENT / PLAN:    1. ETD (Eustachian tube dysfunction), bilateral  D/t recent UR/covid infection  Persistent sx  Tx with prednisone taper as directed  Cont over the counter/symptomatic treatment. Follow up for persistent symptoms in 7 to 10 days or sooner for worsening symptomatology     2. Primary hypertension  Stable, recheck normal    3. Hyperlipidemia, unspecified hyperlipidemia type  Cont statin therapy    4. COVID-19 virus infection  Resolved with exception of mild persistent ear issues as above  Tx with prednisone taper as discussed    5. Essential hypertension  Stable,r echeck normal    6. Screening for AAA (abdominal aortic aneurysm)  Encouraged pt get set up  Encouraged him to get done through [facility] vascular screenig program so we can get carotid as well as AAA screen    7. Screening, ischemic heart disease  Moderate risk factors  Check CT calcium score  Management pending results. - CT CARDIAC CALCIUM SCORING;  Future           Follow-up appointment:   Call or return to clinic prn if these symptoms worsen

## 2022-08-29 ENCOUNTER — TELEPHONE (OUTPATIENT)
Dept: FAMILY MEDICINE CLINIC | Age: 77
End: 2022-08-29

## 2022-08-29 DIAGNOSIS — R42 DIZZINESS: Primary | ICD-10-CM

## 2022-08-29 RX ORDER — METHYLPREDNISOLONE 4 MG/1
TABLET ORAL
Qty: 21 TABLET | Refills: 0 | Status: SHIPPED | OUTPATIENT
Start: 2022-08-29

## 2022-08-29 NOTE — TELEPHONE ENCOUNTER
We can definitely change to a medrol pack  We can also discuss consideration of him having CT head today due to persistent sx.   Notify pts wife, and see if agreeable to consider getting CT done  thanks

## 2022-08-29 NOTE — TELEPHONE ENCOUNTER
Pt said he would like to just try the medrol pack right now. He does not think he can lay down flat long enough for the CT to work due to the dizziness. He would like the medrol pack sent in to the [facility] on [facility].     Thank you

## 2022-08-29 NOTE — TELEPHONE ENCOUNTER
[facility]  predniSONE (DELTASONE) 20 MG is causing extreme dizzness    Pt wife Stoney Mcgovern (on hippa) stated that the pt was seen by [facility] on 8/23/22 for a ringing in his ear that he has had since he had Covid back in June. He was given Prednisone at that visit. Andreina stated that the pt is now having HA in the back of his head and  extreme dizziness when turning his head. 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 The pt has not started to get a bit nausea because of it. Stoney Mcgovern is thinking its the prednisone since the pt did not have this issue prior to starting the medication. The pt has had medrol packs in the past and has not had any reactions. due to the dizziness the pt was only given 20mg yesterday. The pt is still hearing his pulse in his ear. The pt is not having chest pain, no numbness or tingling in arms or legs. Pt wife is wanting to know if they should continue the prednisone or start a medrol pack instead? Should they go see an ENT since he can still hear is pulse in his ear?     Thank you     [phone]

## 2022-08-30 NOTE — TELEPHONE ENCOUNTER
Wife called to check in on order - states she wanted this done today as she was under the impression it would be an immediate thing taken care of.      Thank you

## 2022-08-30 NOTE — TELEPHONE ENCOUNTER
Order in system  It is STAT so please call to help set up  I just got this message 3 minutes ago, let pt know  thanks

## 2022-08-31 ENCOUNTER — HOSPITAL ENCOUNTER (OUTPATIENT)
Dept: CT IMAGING | Age: 77
Discharge: HOME | End: 2022-08-31
Payer: MEDICARE

## 2022-08-31 DIAGNOSIS — R42 DIZZINESS: ICD-10-CM

## 2022-08-31 PROCEDURE — 70450 CT HEAD/BRAIN W/O DYE: CPT

## 2022-09-01 ENCOUNTER — OFFICE VISIT (OUTPATIENT)
Dept: FAMILY MEDICINE CLINIC | Age: 77
End: 2022-09-01
Payer: MEDICARE

## 2022-09-01 VITALS
WEIGHT: 223 LBS | DIASTOLIC BLOOD PRESSURE: 64 MMHG | SYSTOLIC BLOOD PRESSURE: 126 MMHG | OXYGEN SATURATION: 98 % | BODY MASS INDEX: 35 KG/M2 | HEART RATE: 78 BPM | HEIGHT: 67 IN

## 2022-09-01 DIAGNOSIS — H91.93 HEARING DIFFICULTY OF BOTH EARS: ICD-10-CM

## 2022-09-01 DIAGNOSIS — F41.8 SITUATIONAL ANXIETY: ICD-10-CM

## 2022-09-01 DIAGNOSIS — I10 PRIMARY HYPERTENSION: ICD-10-CM

## 2022-09-01 DIAGNOSIS — H81.12 BPV (BENIGN POSITIONAL VERTIGO), LEFT: Primary | ICD-10-CM

## 2022-09-01 DIAGNOSIS — H93.11 TINNITUS OF RIGHT EAR: ICD-10-CM

## 2022-09-01 PROCEDURE — G8427 DOCREV CUR MEDS BY ELIG CLIN: HCPCS | Performed by: FAMILY MEDICINE

## 2022-09-01 PROCEDURE — G8417 CALC BMI ABV UP PARAM F/U: HCPCS | Performed by: FAMILY MEDICINE

## 2022-09-01 PROCEDURE — 1036F TOBACCO NON-USER: CPT | Performed by: FAMILY MEDICINE

## 2022-09-01 PROCEDURE — 1123F ACP DISCUSS/DSCN MKR DOCD: CPT | Performed by: FAMILY MEDICINE

## 2022-09-01 PROCEDURE — 99214 OFFICE O/P EST MOD 30 MIN: CPT | Performed by: FAMILY MEDICINE

## 2022-09-01 RX ORDER — DIAZEPAM 2 MG/1
2 TABLET ORAL EVERY 8 HOURS PRN
Qty: 60 TABLET | Refills: 0 | Status: SHIPPED | OUTPATIENT
Start: 2022-09-01 | End: 2022-10-01

## 2022-09-01 SDOH — ECONOMIC STABILITY: FOOD INSECURITY: WITHIN THE PAST 12 MONTHS, THE FOOD YOU BOUGHT JUST DIDN'T LAST AND YOU DIDN'T HAVE MONEY TO GET MORE.: NEVER TRUE

## 2022-09-01 SDOH — ECONOMIC STABILITY: FOOD INSECURITY: WITHIN THE PAST 12 MONTHS, YOU WORRIED THAT YOUR FOOD WOULD RUN OUT BEFORE YOU GOT MONEY TO BUY MORE.: NEVER TRUE

## 2022-09-01 ASSESSMENT — PATIENT HEALTH QUESTIONNAIRE - PHQ9
SUM OF ALL RESPONSES TO PHQ QUESTIONS 1-9: 0
SUM OF ALL RESPONSES TO PHQ QUESTIONS 1-9: 0
2. FEELING DOWN, DEPRESSED OR HOPELESS: 0
SUM OF ALL RESPONSES TO PHQ9 QUESTIONS 1 & 2: 0
SUM OF ALL RESPONSES TO PHQ QUESTIONS 1-9: 0
SUM OF ALL RESPONSES TO PHQ QUESTIONS 1-9: 0
1. LITTLE INTEREST OR PLEASURE IN DOING THINGS: 0

## 2022-09-01 ASSESSMENT — SOCIAL DETERMINANTS OF HEALTH (SDOH): HOW HARD IS IT FOR YOU TO PAY FOR THE VERY BASICS LIKE FOOD, HOUSING, MEDICAL CARE, AND HEATING?: NOT HARD AT ALL

## 2022-09-01 NOTE — PROGRESS NOTES
and addiction potential.  - Controlled Substance Monitoring:    Acute and Chronic Pain Monitoring:   RX Monitoring 9/1/2022   Periodic Controlled Substance Monitoring Possible medication side effects, risk of tolerance/dependence & alternative treatments discussed. ;No signs of potential drug abuse or diversion identified. - Ambulatory referral to Physical Therapy- [place]     2. Hearing difficulty of both ears  - left abnormal at 20 decibels - 500 & 1000. - Monitor and consider recheck after acute symptoms improved. 3. Tinnitus of right ear  - Monitor. If persists discussed audiologist/ Otolaryngology referral.    4. Situational anxiety  - Stable. Continue Wellbutrin  mg qd and Valium 2 mg prn    5. Primary hypertension  -  monitor blood pressure and follow up if > 139/89.   - Continue Amlodipine 10 mg qd, Lisinopril 40 mg qd and low sodium diet. Follow up 4 weeks/ prn.

## 2022-09-12 ENCOUNTER — TELEPHONE (OUTPATIENT)
Dept: FAMILY MEDICINE CLINIC | Age: 77
End: 2022-09-12

## 2022-09-12 NOTE — TELEPHONE ENCOUNTER
Patient's wife called, stating he is not having the vertigo anymore and would like to wing off the Valium. He was taking the Valium TID when he first started taking it. Last week he decreased the dose to BID. I advised him to decrease the dose to once a day for a week and then to 1/2 pill for a week. If he had any questions or concerns once completely stopped he is to call our office.

## 2022-09-27 PROBLEM — C44.311 BASAL CELL CARCINOMA (BCC) OF RIGHT SIDE OF NOSE: Status: ACTIVE | Noted: 2022-09-01

## 2022-10-10 PROBLEM — I25.10 CAD (CORONARY ARTERY DISEASE): Status: ACTIVE | Noted: 2022-10-01

## 2022-10-11 DIAGNOSIS — E78.5 HYPERLIPIDEMIA, UNSPECIFIED HYPERLIPIDEMIA TYPE: ICD-10-CM

## 2022-10-11 RX ORDER — SIMVASTATIN 20 MG
TABLET ORAL
Qty: 90 TABLET | Refills: 0 | Status: SHIPPED | OUTPATIENT
Start: 2022-10-11

## 2022-10-11 NOTE — TELEPHONE ENCOUNTER
Requested Prescriptions     Pending Prescriptions Disp Refills    simvastatin (ZOCOR) 20 MG tablet [Pharmacy Med Name: SIMVASTATIN 20 MG TABLET] 90 tablet 1     Sig: TAKE 1 TABLET BY MOUTH EVERY DAY AT NIGHT     Last ov 9/1/22  Last lab 4/1/22  Next ov n/a

## 2022-12-09 DIAGNOSIS — I10 ESSENTIAL HYPERTENSION: ICD-10-CM

## 2022-12-09 RX ORDER — LISINOPRIL 40 MG/1
TABLET ORAL
Qty: 90 TABLET | Refills: 1 | Status: SHIPPED | OUTPATIENT
Start: 2022-12-09

## 2022-12-09 RX ORDER — AMLODIPINE BESYLATE 10 MG/1
TABLET ORAL
Qty: 90 TABLET | Refills: 1 | Status: SHIPPED | OUTPATIENT
Start: 2022-12-09

## 2022-12-09 NOTE — TELEPHONE ENCOUNTER
Requested Prescriptions     Pending Prescriptions Disp Refills    amLODIPine (NORVASC) 10 MG tablet [Pharmacy Med Name: AMLODIPINE BESYLATE 10 MG TAB] 90 tablet 1     Sig: TAKE 1 TABLET BY MOUTH EVERY DAY    lisinopril (PRINIVIL;ZESTRIL) 40 MG tablet [Pharmacy Med Name: LISINOPRIL 40 MG TABLET] 90 tablet 1     Sig: TAKE 1 TABLET BY MOUTH EVERY DAY     Last ov 9/12/22  Last lab 4/1/22  Next ov n/a

## 2022-12-12 DIAGNOSIS — F41.8 SITUATIONAL ANXIETY: ICD-10-CM

## 2022-12-12 RX ORDER — BUPROPION HYDROCHLORIDE 300 MG/1
TABLET ORAL
Qty: 90 TABLET | Refills: 1 | Status: SHIPPED | OUTPATIENT
Start: 2022-12-12

## 2022-12-12 NOTE — TELEPHONE ENCOUNTER
Requested Prescriptions     Pending Prescriptions Disp Refills    buPROPion (WELLBUTRIN XL) 300 MG extended release tablet [Pharmacy Med Name: BUPROPION HCL  MG TABLET] 90 tablet 1     Sig: TAKE 1 TABLET BY MOUTH EVERY DAY IN THE MORNING       Last Office Visit: 9/1/2022     Next Office Visit: Visit date not found     Last Labs: 4/1/22

## 2023-01-10 DIAGNOSIS — M51.36 DDD (DEGENERATIVE DISC DISEASE), LUMBAR: ICD-10-CM

## 2023-01-10 RX ORDER — DICLOFENAC SODIUM 75 MG/1
TABLET, DELAYED RELEASE ORAL
Qty: 180 TABLET | Refills: 0 | Status: SHIPPED | OUTPATIENT
Start: 2023-01-10

## 2023-01-10 NOTE — TELEPHONE ENCOUNTER
Requested Prescriptions     Pending Prescriptions Disp Refills    diclofenac (VOLTAREN) 75 MG EC tablet [Pharmacy Med Name: DICLOFENAC SOD EC 75 MG TAB] 180 tablet 0     Sig: TAKE 1 TAB BY MOUTH 2 TIMES DAILY AS NEEDED FOR BACK PAIN. TAKE WITH FOOD.      Last ov 9/1/2022  Last labs 4/1/22

## 2023-01-29 DIAGNOSIS — E78.5 HYPERLIPIDEMIA, UNSPECIFIED HYPERLIPIDEMIA TYPE: ICD-10-CM

## 2023-01-30 RX ORDER — SIMVASTATIN 20 MG
TABLET ORAL
Qty: 90 TABLET | Refills: 1 | Status: SHIPPED | OUTPATIENT
Start: 2023-01-30

## 2023-01-30 NOTE — TELEPHONE ENCOUNTER
Requested Prescriptions     Pending Prescriptions Disp Refills    simvastatin (ZOCOR) 20 MG tablet [Pharmacy Med Name: SIMVASTATIN 20 MG TABLET] 90 tablet 0     Sig: TAKE 1 TABLET BY MOUTH EVERY DAY AT NIGHT. FOLLOW UP APPOINTMENT LABS ARE NEEDED.      Last ov 9/1/22  Last lab 4/1/22  Next ov  n/a